# Patient Record
Sex: FEMALE | Race: WHITE | HISPANIC OR LATINO | Employment: PART TIME | URBAN - METROPOLITAN AREA
[De-identification: names, ages, dates, MRNs, and addresses within clinical notes are randomized per-mention and may not be internally consistent; named-entity substitution may affect disease eponyms.]

---

## 2020-10-27 ENCOUNTER — HOSPITAL ENCOUNTER (EMERGENCY)
Facility: HOSPITAL | Age: 45
Discharge: HOME | End: 2020-10-27
Attending: EMERGENCY MEDICINE
Payer: COMMERCIAL

## 2020-10-27 ENCOUNTER — APPOINTMENT (EMERGENCY)
Dept: CT IMAGING | Facility: HOSPITAL | Age: 45
End: 2020-10-27
Payer: COMMERCIAL

## 2020-10-27 VITALS
OXYGEN SATURATION: 98 % | BODY MASS INDEX: 35.36 KG/M2 | SYSTOLIC BLOOD PRESSURE: 110 MMHG | DIASTOLIC BLOOD PRESSURE: 61 MMHG | TEMPERATURE: 97.8 F | RESPIRATION RATE: 16 BRPM | WEIGHT: 225.75 LBS | HEART RATE: 70 BPM

## 2020-10-27 DIAGNOSIS — N64.4 BREAST PAIN, LEFT: Primary | ICD-10-CM

## 2020-10-27 DIAGNOSIS — N64.4 ACUTE BREAST PAIN: ICD-10-CM

## 2020-10-27 LAB
ANION GAP SERPL CALCULATED.3IONS-SCNC: 5 MMOL/L (ref 4–13)
BASOPHILS # BLD AUTO: 0.06 THOUSANDS/ΜL (ref 0–0.1)
BASOPHILS NFR BLD AUTO: 1 % (ref 0–1)
BUN SERPL-MCNC: 6 MG/DL (ref 5–25)
CALCIUM SERPL-MCNC: 8.7 MG/DL (ref 8.3–10.1)
CHLORIDE SERPL-SCNC: 104 MMOL/L (ref 100–108)
CO2 SERPL-SCNC: 30 MMOL/L (ref 21–32)
CREAT SERPL-MCNC: 0.69 MG/DL (ref 0.6–1.3)
EOSINOPHIL # BLD AUTO: 0.13 THOUSAND/ΜL (ref 0–0.61)
EOSINOPHIL NFR BLD AUTO: 2 % (ref 0–6)
ERYTHROCYTE [DISTWIDTH] IN BLOOD BY AUTOMATED COUNT: 13.2 % (ref 11.6–15.1)
GFR SERPL CREATININE-BSD FRML MDRD: 106 ML/MIN/1.73SQ M
GLUCOSE SERPL-MCNC: 87 MG/DL (ref 65–140)
HCT VFR BLD AUTO: 43.8 % (ref 34.8–46.1)
HGB BLD-MCNC: 14.1 G/DL (ref 11.5–15.4)
IMM GRANULOCYTES # BLD AUTO: 0.02 THOUSAND/UL (ref 0–0.2)
IMM GRANULOCYTES NFR BLD AUTO: 0 % (ref 0–2)
LYMPHOCYTES # BLD AUTO: 2.25 THOUSANDS/ΜL (ref 0.6–4.47)
LYMPHOCYTES NFR BLD AUTO: 30 % (ref 14–44)
MCH RBC QN AUTO: 29.9 PG (ref 26.8–34.3)
MCHC RBC AUTO-ENTMCNC: 32.2 G/DL (ref 31.4–37.4)
MCV RBC AUTO: 93 FL (ref 82–98)
MONOCYTES # BLD AUTO: 0.55 THOUSAND/ΜL (ref 0.17–1.22)
MONOCYTES NFR BLD AUTO: 7 % (ref 4–12)
NEUTROPHILS # BLD AUTO: 4.57 THOUSANDS/ΜL (ref 1.85–7.62)
NEUTS SEG NFR BLD AUTO: 60 % (ref 43–75)
NRBC BLD AUTO-RTO: 0 /100 WBCS
PLATELET # BLD AUTO: 218 THOUSANDS/UL (ref 149–390)
PMV BLD AUTO: 10.5 FL (ref 8.9–12.7)
POTASSIUM SERPL-SCNC: 4.3 MMOL/L (ref 3.5–5.3)
RBC # BLD AUTO: 4.72 MILLION/UL (ref 3.81–5.12)
SODIUM SERPL-SCNC: 139 MMOL/L (ref 136–145)
WBC # BLD AUTO: 7.58 THOUSAND/UL (ref 4.31–10.16)

## 2020-10-27 PROCEDURE — 85025 COMPLETE CBC W/AUTO DIFF WBC: CPT | Performed by: EMERGENCY MEDICINE

## 2020-10-27 PROCEDURE — 71260 CT THORAX DX C+: CPT

## 2020-10-27 PROCEDURE — 99284 EMERGENCY DEPT VISIT MOD MDM: CPT | Performed by: EMERGENCY MEDICINE

## 2020-10-27 PROCEDURE — 36415 COLL VENOUS BLD VENIPUNCTURE: CPT | Performed by: EMERGENCY MEDICINE

## 2020-10-27 PROCEDURE — 80048 BASIC METABOLIC PNL TOTAL CA: CPT | Performed by: EMERGENCY MEDICINE

## 2020-10-27 PROCEDURE — G1004 CDSM NDSC: HCPCS

## 2020-10-27 PROCEDURE — 99284 EMERGENCY DEPT VISIT MOD MDM: CPT

## 2020-10-27 RX ADMIN — IOHEXOL 85 ML: 350 INJECTION, SOLUTION INTRAVENOUS at 13:45

## 2020-11-11 ENCOUNTER — HOSPITAL ENCOUNTER (EMERGENCY)
Facility: HOSPITAL | Age: 45
Discharge: HOME | End: 2020-11-11
Attending: EMERGENCY MEDICINE
Payer: COMMERCIAL

## 2020-11-11 VITALS
BODY MASS INDEX: 35.56 KG/M2 | HEART RATE: 65 BPM | SYSTOLIC BLOOD PRESSURE: 109 MMHG | OXYGEN SATURATION: 100 % | RESPIRATION RATE: 16 BRPM | WEIGHT: 227.07 LBS | DIASTOLIC BLOOD PRESSURE: 58 MMHG | TEMPERATURE: 98.3 F

## 2020-11-11 DIAGNOSIS — B34.9 ACUTE VIRAL SYNDROME: Primary | ICD-10-CM

## 2020-11-11 PROCEDURE — 99282 EMERGENCY DEPT VISIT SF MDM: CPT | Performed by: PHYSICIAN ASSISTANT

## 2020-11-11 PROCEDURE — 99283 EMERGENCY DEPT VISIT LOW MDM: CPT

## 2020-11-11 PROCEDURE — 87637 SARSCOV2&INF A&B&RSV AMP PRB: CPT | Performed by: PHYSICIAN ASSISTANT

## 2020-11-14 LAB
FLUAV RNA NPH QL NAA+PROBE: NOT DETECTED
FLUBV RNA NPH QL NAA+PROBE: NOT DETECTED
RSV RNA NPH QL NAA+PROBE: NOT DETECTED
SARS-COV-2 RNA NPH QL NAA+PROBE: NOT DETECTED

## 2021-01-05 ENCOUNTER — HOSPITAL ENCOUNTER (EMERGENCY)
Facility: HOSPITAL | Age: 46
Discharge: HOME | End: 2021-01-05
Attending: EMERGENCY MEDICINE | Admitting: EMERGENCY MEDICINE
Payer: COMMERCIAL

## 2021-01-05 VITALS
TEMPERATURE: 97.7 F | HEART RATE: 70 BPM | RESPIRATION RATE: 17 BRPM | SYSTOLIC BLOOD PRESSURE: 123 MMHG | DIASTOLIC BLOOD PRESSURE: 63 MMHG | OXYGEN SATURATION: 98 %

## 2021-01-05 DIAGNOSIS — Z20.822 SUSPECTED COVID-19 VIRUS INFECTION: Primary | ICD-10-CM

## 2021-01-05 PROCEDURE — 99282 EMERGENCY DEPT VISIT SF MDM: CPT | Performed by: PHYSICIAN ASSISTANT

## 2021-01-05 PROCEDURE — 99283 EMERGENCY DEPT VISIT LOW MDM: CPT

## 2021-01-05 PROCEDURE — U0003 INFECTIOUS AGENT DETECTION BY NUCLEIC ACID (DNA OR RNA); SEVERE ACUTE RESPIRATORY SYNDROME CORONAVIRUS 2 (SARS-COV-2) (CORONAVIRUS DISEASE [COVID-19]), AMPLIFIED PROBE TECHNIQUE, MAKING USE OF HIGH THROUGHPUT TECHNOLOGIES AS DESCRIBED BY CMS-2020-01-R: HCPCS | Performed by: PHYSICIAN ASSISTANT

## 2021-01-05 NOTE — Clinical Note
Jinny Napoles was seen and treated in our emergency department on 1/5/2021  Diagnosis:     Kari    She may return on this date:     Patient tested for COVID-19  Pt will need to quarantine until results come back in approximately 3-5 days  If COVID positive, patient requires 14 day self-quarantine  If COVID negative and patient symptom free for 3 days, patient will be able to return to work  If you have any questions or concerns, please don't hesitate to call        Maine Tracy PA-C    ______________________________           _______________          _______________  Hospital Representative                              Date                                Time

## 2021-01-06 NOTE — ED PROVIDER NOTES
HPI: Patient is a 41 y o  female who presents with 2 days of chills, cough, headache, sore throat and myalgias which the patient describes at mild The patient has had contact with people with similar symptoms  The patient has not taken any medication  No Known Allergies    History reviewed  No pertinent past medical history  Past Surgical History:   Procedure Laterality Date    BREAST SURGERY       Social History     Tobacco Use    Smoking status: Current Every Day Smoker     Packs/day: 0 50    Smokeless tobacco: Never Used   Substance Use Topics    Alcohol use: Not Currently    Drug use: Never       Nursing notes reviewed  Physical Exam:  ED Triage Vitals [01/05/21 2035]   Temperature Pulse Respirations Blood Pressure SpO2   97 7 °F (36 5 °C) 70 17 123/63 98 %      Temp src Heart Rate Source Patient Position - Orthostatic VS BP Location FiO2 (%)   -- Monitor -- -- --      Pain Score       3           ROS: Positive for cough, body aches, sore throat, congestion, chills, the remainder of a 10 organ system ROS was otherwise unremarkable  General: awake, alert, no acute distress    Head: normocephalic, atraumatic    Eyes: no scleral icterus  Ears: external ears normal, hearing grossly intact  Nose: external exam grossly normal, positive nasal discharge  Neck: symmetric, No JVD noted, trachea midline  Pulmonary: no respiratory distress, no tachypnea noted  Cardiovascular: appears well perfused  Abdomen: no distention noted  Musculoskeletal: no deformities noted, tone normal  Neuro: grossly non-focal  Psych: mood and affect appropriate    The patient is stable and has a history and physical exam consistent with a viral illness  COVID19 testing has been performed  I considered the patient's other medical conditions as applicable/noted above in my medical decision making  The patient is stable upon discharge  The plan is for supportive care at home      The patient (and any family present) verbalized understanding of the discharge instructions and warnings that would necessitate return to the Emergency Department  All questions were answered prior to discharge  Quick COVID-19 Severity Index (Predicts 24-hr risk of critical respiratory illness in patient's admitted from ED with COVID-19 )   Respiratory rate: <22 = 0  Pulse oximetry >92 = 0  O2 flow rate (L/min) <2 = 0  Result = 0 (low risk - 4% risk of critical illness at 24 hours)    Medications - No data to display  Final diagnoses:   Suspected COVID-19 virus infection     Time reflects when diagnosis was documented in both MDM as applicable and the Disposition within this note     Time User Action Codes Description Comment    1/5/2021  9:51 PM Pastor Salazar Add [Z20 [id number]] Suspected COVID-19 virus infection       ED Disposition     ED Disposition Condition Date/Time Comment    Discharge Stable Tue Jan 5, 2021  9:51 PM Kari Valenzuela discharge to home/self care  Follow-up Information     Follow up With Specialties Details Why Contact Info Additional [address] [facility] Emergency Department Emergency Medicine Go to  If symptoms worsen [facility] [id number] [phone] [place] ED, [address], [place], [address]    [phone]) option 7    If you have concerns about COVID 19, call the hotline to discuss your symptoms and determine if you need testing         Patient's Medications    No medications on file     No discharge procedures on file      Electronically Signed by       Juanjose Hendrickson PA-C  01/05/21 5363

## 2021-01-07 LAB — SARS-COV-2 RNA SPEC QL NAA+PROBE: NOT DETECTED

## 2021-02-15 ENCOUNTER — OFFICE VISIT (OUTPATIENT)
Dept: FAMILY MEDICINE CLINIC | Facility: CLINIC | Age: 46
End: 2021-02-15

## 2021-02-15 ENCOUNTER — TELEPHONE (OUTPATIENT)
Dept: FAMILY MEDICINE CLINIC | Facility: CLINIC | Age: 46
End: 2021-02-15

## 2021-02-15 VITALS
RESPIRATION RATE: 16 BRPM | HEART RATE: 75 BPM | SYSTOLIC BLOOD PRESSURE: 112 MMHG | WEIGHT: 234 LBS | BODY MASS INDEX: 36.65 KG/M2 | DIASTOLIC BLOOD PRESSURE: 76 MMHG | TEMPERATURE: 98 F | OXYGEN SATURATION: 96 %

## 2021-02-15 DIAGNOSIS — E66.9 OBESITY, UNSPECIFIED CLASSIFICATION, UNSPECIFIED OBESITY TYPE, UNSPECIFIED WHETHER SERIOUS COMORBIDITY PRESENT: Primary | ICD-10-CM

## 2021-02-15 DIAGNOSIS — F17.200 SMOKES: ICD-10-CM

## 2021-02-15 DIAGNOSIS — N60.02 CYST OF LEFT BREAST: ICD-10-CM

## 2021-02-15 DIAGNOSIS — R00.2 PALPITATIONS: ICD-10-CM

## 2021-02-15 DIAGNOSIS — K21.9 GASTROESOPHAGEAL REFLUX DISEASE WITHOUT ESOPHAGITIS: ICD-10-CM

## 2021-02-15 DIAGNOSIS — M54.50 CHRONIC MIDLINE LOW BACK PAIN, UNSPECIFIED WHETHER SCIATICA PRESENT: ICD-10-CM

## 2021-02-15 DIAGNOSIS — G89.29 CHRONIC MIDLINE LOW BACK PAIN, UNSPECIFIED WHETHER SCIATICA PRESENT: ICD-10-CM

## 2021-02-15 DIAGNOSIS — Z72.0 TOBACCO ABUSE: ICD-10-CM

## 2021-02-15 PROCEDURE — 4004F PT TOBACCO SCREEN RCVD TLK: CPT | Performed by: FAMILY MEDICINE

## 2021-02-15 PROCEDURE — 3725F SCREEN DEPRESSION PERFORMED: CPT | Performed by: FAMILY MEDICINE

## 2021-02-15 PROCEDURE — 99203 OFFICE O/P NEW LOW 30 MIN: CPT | Performed by: FAMILY MEDICINE

## 2021-02-15 RX ORDER — VARENICLINE TARTRATE 0.5 MG/1
0.5 TABLET, FILM COATED ORAL 2 TIMES DAILY
Qty: 30 TABLET | Refills: 1 | Status: SHIPPED | OUTPATIENT
Start: 2021-02-15

## 2021-02-15 RX ORDER — HYDROXYZINE HYDROCHLORIDE 25 MG/1
25 TABLET, FILM COATED ORAL EVERY 6 HOURS PRN
Qty: 30 TABLET | Refills: 0 | Status: SHIPPED | OUTPATIENT
Start: 2021-02-15

## 2021-02-15 RX ORDER — OMEPRAZOLE 20 MG/1
20 CAPSULE, DELAYED RELEASE ORAL DAILY
Qty: 30 CAPSULE | Refills: 0 | Status: SHIPPED | OUTPATIENT
Start: 2021-02-15

## 2021-02-15 NOTE — TELEPHONE ENCOUNTER
Dahiana      Please schedule with Dr Guzman Lay and myself during  block on Thursday morning      Thank you
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
## 2021-02-15 NOTE — ASSESSMENT & PLAN NOTE
-Smokes 1/2 PPD since 16years old  -Tobacco Cessation Counseling: Tobacco cessation counseling and education was provided  The patient is sincerely urged to quit consumption of tobacco  She is ready to quit tobacco  The numerous health risks of tobacco consumption were discussed  Prescribed the following medications: varenicline (Chantix)

## 2021-02-15 NOTE — ASSESSMENT & PLAN NOTE
-Reviewed the causes of heartburn  Discussed importance of diet and lifestyle modifications to control GERD symptoms  Avoid things that worsen heartburn (ex: caffeine, tomato based products, spicy foods, tobacco, alcohol, obesity, tight fitting clothing ) Discussed importance of eating small, frequent meals instead of large meals   Elevate head of the bed and do not lay down for 2-3 hours following a meal    - Will start her on omeprazole 20 mg daily

## 2021-02-15 NOTE — ASSESSMENT & PLAN NOTE
-1 month onset of intermittent palpitations accompanied with SOB and parasthesia  -Suspect etiology is due to panic attack  -Given significant family history of arrhythmia will send for Holter monitor   -Will check TSH  -Atarax 25 mg PRN  -Behavioral health referral

## 2021-02-15 NOTE — ASSESSMENT & PLAN NOTE
-History of Left breast cyst versus abscess 2 years ago  -No records available on EMR  -Was seen in ED recently for Chest wall pain-> CT of chest was normal  -Will send for diagnostic mammogram and ultrasound

## 2021-02-15 NOTE — ASSESSMENT & PLAN NOTE
-Chronic low back pain   -No red flag symptoms (lower extremity weakness, & incontinence of bladder or bowel)     -Physical exam noted for midline spine tenderness and paraspinal muscle tenderness  -Will obtain X-ray of spine  -Can take Tylenol 1000 mg Q8, Ibuprofen 200 mg Q6, apply warm compresses  -Will send to physical therapy

## 2021-02-15 NOTE — ASSESSMENT & PLAN NOTE
BMI Counseling: Body mass index is 36 65 kg/m²  The BMI is above normal  Nutrition recommendations include reducing portion sizes, decreasing overall calorie intake, 3-5 servings of fruits/vegetables daily, reducing fast food intake, consuming healthier snacks, moderation in carbohydrate intake, increasing intake of lean protein and reducing intake of cholesterol  Exercise recommendations include moderate aerobic physical activity for 150 minutes/week     -Lipid panel & HBA1C

## 2021-02-17 ENCOUNTER — LAB (OUTPATIENT)
Dept: LAB | Facility: HOSPITAL | Age: 46
End: 2021-02-17
Payer: COMMERCIAL

## 2021-02-17 DIAGNOSIS — E66.9 OBESITY, UNSPECIFIED CLASSIFICATION, UNSPECIFIED OBESITY TYPE, UNSPECIFIED WHETHER SERIOUS COMORBIDITY PRESENT: ICD-10-CM

## 2021-02-17 DIAGNOSIS — R00.2 PALPITATIONS: ICD-10-CM

## 2021-02-17 LAB
CHOLEST SERPL-MCNC: 186 MG/DL (ref 50–200)
EST. AVERAGE GLUCOSE BLD GHB EST-MCNC: 97 MG/DL
HBA1C MFR BLD: 5 %
HDLC SERPL-MCNC: 61 MG/DL
LDLC SERPL CALC-MCNC: 109 MG/DL (ref 0–100)
NONHDLC SERPL-MCNC: 125 MG/DL
T4 FREE SERPL-MCNC: 0.95 NG/DL (ref 0.76–1.46)
TRIGL SERPL-MCNC: 78 MG/DL
TSH SERPL DL<=0.05 MIU/L-ACNC: 1.68 UIU/ML (ref 0.36–3.74)

## 2021-02-17 PROCEDURE — 36415 COLL VENOUS BLD VENIPUNCTURE: CPT

## 2021-02-17 PROCEDURE — 84439 ASSAY OF FREE THYROXINE: CPT

## 2021-02-17 PROCEDURE — 83036 HEMOGLOBIN GLYCOSYLATED A1C: CPT

## 2021-02-17 PROCEDURE — 84443 ASSAY THYROID STIM HORMONE: CPT

## 2021-02-17 PROCEDURE — 80061 LIPID PANEL: CPT

## 2021-04-05 ENCOUNTER — HOSPITAL ENCOUNTER (OUTPATIENT)
Dept: NON INVASIVE DIAGNOSTICS | Facility: HOSPITAL | Age: 46
Discharge: HOME | End: 2021-04-05
Payer: COMMERCIAL

## 2021-04-05 DIAGNOSIS — R00.2 PALPITATIONS: ICD-10-CM

## 2021-04-05 PROCEDURE — 93226 XTRNL ECG REC<48 HR SCAN A/R: CPT

## 2021-04-05 PROCEDURE — 93225 XTRNL ECG REC<48 HRS REC: CPT

## 2021-04-16 PROCEDURE — 93227 XTRNL ECG REC<48 HR R&I: CPT | Performed by: INTERNAL MEDICINE

## 2021-05-04 ENCOUNTER — HOSPITAL ENCOUNTER (OUTPATIENT)
Dept: RADIOLOGY | Facility: HOSPITAL | Age: 46
Discharge: HOME | End: 2021-05-04
Payer: COMMERCIAL

## 2021-05-04 DIAGNOSIS — M54.50 CHRONIC MIDLINE LOW BACK PAIN, UNSPECIFIED WHETHER SCIATICA PRESENT: ICD-10-CM

## 2021-05-04 DIAGNOSIS — G89.29 CHRONIC MIDLINE LOW BACK PAIN, UNSPECIFIED WHETHER SCIATICA PRESENT: ICD-10-CM

## 2021-05-04 PROCEDURE — 72100 X-RAY EXAM L-S SPINE 2/3 VWS: CPT

## 2021-07-22 ENCOUNTER — VBI (OUTPATIENT)
Dept: ADMINISTRATIVE | Facility: OTHER | Age: 46
End: 2021-07-22

## 2021-09-27 ENCOUNTER — HOSPITAL ENCOUNTER (EMERGENCY)
Facility: HOSPITAL | Age: 46
Discharge: HOME | End: 2021-09-27
Attending: EMERGENCY MEDICINE
Payer: COMMERCIAL

## 2021-09-27 ENCOUNTER — TELEPHONE (OUTPATIENT)
Dept: FAMILY MEDICINE CLINIC | Facility: CLINIC | Age: 46
End: 2021-09-27

## 2021-09-27 VITALS
SYSTOLIC BLOOD PRESSURE: 143 MMHG | TEMPERATURE: 98 F | HEART RATE: 67 BPM | RESPIRATION RATE: 16 BRPM | DIASTOLIC BLOOD PRESSURE: 65 MMHG | OXYGEN SATURATION: 100 % | BODY MASS INDEX: 35.43 KG/M2 | WEIGHT: 226.19 LBS

## 2021-09-27 DIAGNOSIS — Z20.822 SUSPECTED COVID-19 VIRUS INFECTION: Primary | ICD-10-CM

## 2021-09-27 LAB — SARS-COV-2 RNA RESP QL NAA+PROBE: NEGATIVE

## 2021-09-27 PROCEDURE — U0003 INFECTIOUS AGENT DETECTION BY NUCLEIC ACID (DNA OR RNA); SEVERE ACUTE RESPIRATORY SYNDROME CORONAVIRUS 2 (SARS-COV-2) (CORONAVIRUS DISEASE [COVID-19]), AMPLIFIED PROBE TECHNIQUE, MAKING USE OF HIGH THROUGHPUT TECHNOLOGIES AS DESCRIBED BY CMS-2020-01-R: HCPCS | Performed by: PHYSICIAN ASSISTANT

## 2021-09-27 PROCEDURE — 99282 EMERGENCY DEPT VISIT SF MDM: CPT

## 2021-09-27 PROCEDURE — U0005 INFEC AGEN DETEC AMPLI PROBE: HCPCS | Performed by: PHYSICIAN ASSISTANT

## 2021-09-27 PROCEDURE — 99284 EMERGENCY DEPT VISIT MOD MDM: CPT | Performed by: PHYSICIAN ASSISTANT

## 2021-09-27 RX ORDER — NAPROXEN 500 MG/1
500 TABLET ORAL 2 TIMES DAILY PRN
Qty: 30 TABLET | Refills: 0 | Status: SHIPPED | OUTPATIENT
Start: 2021-09-27 | End: 2022-07-30 | Stop reason: SDUPTHER

## 2021-09-27 RX ORDER — SENNOSIDES 8.6 MG
650 CAPSULE ORAL EVERY 8 HOURS PRN
Qty: 30 TABLET | Refills: 0 | Status: SHIPPED | OUTPATIENT
Start: 2021-09-27

## 2021-09-27 RX ORDER — BENZONATATE 100 MG/1
100 CAPSULE ORAL 3 TIMES DAILY PRN
Qty: 21 CAPSULE | Refills: 0 | Status: SHIPPED | OUTPATIENT
Start: 2021-09-27 | End: 2022-07-30

## 2021-09-27 NOTE — TELEPHONE ENCOUNTER
Pt called in says that she had a 940 virtual appt and no one called her, I told her notes states dr Aissatou Sequeira tried to call with no answer, I offered to make patient another appt for the next available which was a 340 same day and patient refused she states that this is ridiculous that she has been trying to get this taken care of since Thursday last week, I advised pt that if she didn't want the nest available for today the next option would be the emergency room pt said that fine I'll go to the emergency room since I called on Thursday and I still haven't taken care of the problem

## 2021-09-27 NOTE — TELEPHONE ENCOUNTER
Called patient on her cell phone for [phone] virtual appointment  She did not  her phone, left voicemail to call office and reschedule appointment

## 2021-10-26 ENCOUNTER — HOSPITAL ENCOUNTER (EMERGENCY)
Facility: HOSPITAL | Age: 46
Discharge: HOME | End: 2021-10-26
Attending: EMERGENCY MEDICINE
Payer: COMMERCIAL

## 2021-10-26 VITALS
BODY MASS INDEX: 35.36 KG/M2 | TEMPERATURE: 97.8 F | SYSTOLIC BLOOD PRESSURE: 124 MMHG | DIASTOLIC BLOOD PRESSURE: 77 MMHG | HEART RATE: 68 BPM | RESPIRATION RATE: 18 BRPM | WEIGHT: 225.75 LBS | OXYGEN SATURATION: 99 %

## 2021-10-26 DIAGNOSIS — R12 HEART BURN: ICD-10-CM

## 2021-10-26 DIAGNOSIS — R10.9 ACUTE ABDOMINAL PAIN: Primary | ICD-10-CM

## 2021-10-26 LAB
ALBUMIN SERPL BCP-MCNC: 3.6 G/DL (ref 3.5–5)
ALP SERPL-CCNC: 71 U/L (ref 46–116)
ALT SERPL W P-5'-P-CCNC: 26 U/L (ref 12–78)
ANION GAP SERPL CALCULATED.3IONS-SCNC: 6 MMOL/L (ref 4–13)
AST SERPL W P-5'-P-CCNC: 16 U/L (ref 5–45)
BASOPHILS # BLD AUTO: 0.05 THOUSANDS/ΜL (ref 0–0.1)
BASOPHILS NFR BLD AUTO: 1 % (ref 0–1)
BILIRUB SERPL-MCNC: 0.27 MG/DL (ref 0.2–1)
BILIRUB UR QL STRIP: NEGATIVE
BUN SERPL-MCNC: 11 MG/DL (ref 5–25)
CALCIUM SERPL-MCNC: 9.2 MG/DL (ref 8.3–10.1)
CHLORIDE SERPL-SCNC: 106 MMOL/L (ref 100–108)
CLARITY UR: CLEAR
CO2 SERPL-SCNC: 32 MMOL/L (ref 21–32)
COLOR UR: NORMAL
CREAT SERPL-MCNC: 0.84 MG/DL (ref 0.6–1.3)
EOSINOPHIL # BLD AUTO: 0.19 THOUSAND/ΜL (ref 0–0.61)
EOSINOPHIL NFR BLD AUTO: 2 % (ref 0–6)
ERYTHROCYTE [DISTWIDTH] IN BLOOD BY AUTOMATED COUNT: 13.6 % (ref 11.6–15.1)
EXT PREG TEST URINE: NEGATIVE
EXT. CONTROL ED NAV: NORMAL
GFR SERPL CREATININE-BSD FRML MDRD: 84 ML/MIN/1.73SQ M
GLUCOSE SERPL-MCNC: 64 MG/DL (ref 65–140)
GLUCOSE UR STRIP-MCNC: NEGATIVE MG/DL
HCT VFR BLD AUTO: 44.4 % (ref 34.8–46.1)
HGB BLD-MCNC: 14.3 G/DL (ref 11.5–15.4)
HGB UR QL STRIP.AUTO: NEGATIVE
IMM GRANULOCYTES # BLD AUTO: 0.02 THOUSAND/UL (ref 0–0.2)
IMM GRANULOCYTES NFR BLD AUTO: 0 % (ref 0–2)
KETONES UR STRIP-MCNC: NEGATIVE MG/DL
LEUKOCYTE ESTERASE UR QL STRIP: NEGATIVE
LIPASE SERPL-CCNC: 200 U/L (ref 73–393)
LYMPHOCYTES # BLD AUTO: 2.35 THOUSANDS/ΜL (ref 0.6–4.47)
LYMPHOCYTES NFR BLD AUTO: 28 % (ref 14–44)
MCH RBC QN AUTO: 30.4 PG (ref 26.8–34.3)
MCHC RBC AUTO-ENTMCNC: 32.2 G/DL (ref 31.4–37.4)
MCV RBC AUTO: 95 FL (ref 82–98)
MONOCYTES # BLD AUTO: 0.71 THOUSAND/ΜL (ref 0.17–1.22)
MONOCYTES NFR BLD AUTO: 8 % (ref 4–12)
NEUTROPHILS # BLD AUTO: 5.19 THOUSANDS/ΜL (ref 1.85–7.62)
NEUTS SEG NFR BLD AUTO: 61 % (ref 43–75)
NITRITE UR QL STRIP: NEGATIVE
NRBC BLD AUTO-RTO: 0 /100 WBCS
PH UR STRIP.AUTO: 5.5 [PH] (ref 4.5–8)
PLATELET # BLD AUTO: 256 THOUSANDS/UL (ref 149–390)
PMV BLD AUTO: 10.3 FL (ref 8.9–12.7)
POTASSIUM SERPL-SCNC: 4.1 MMOL/L (ref 3.5–5.3)
PROT SERPL-MCNC: 7.6 G/DL (ref 6.4–8.2)
PROT UR STRIP-MCNC: NEGATIVE MG/DL
RBC # BLD AUTO: 4.7 MILLION/UL (ref 3.81–5.12)
SODIUM SERPL-SCNC: 144 MMOL/L (ref 136–145)
SP GR UR STRIP.AUTO: >=1.03 (ref 1–1.03)
UROBILINOGEN UR QL STRIP.AUTO: 0.2 E.U./DL
WBC # BLD AUTO: 8.51 THOUSAND/UL (ref 4.31–10.16)

## 2021-10-26 PROCEDURE — 99284 EMERGENCY DEPT VISIT MOD MDM: CPT | Performed by: EMERGENCY MEDICINE

## 2021-10-26 PROCEDURE — 96374 THER/PROPH/DIAG INJ IV PUSH: CPT

## 2021-10-26 PROCEDURE — 36415 COLL VENOUS BLD VENIPUNCTURE: CPT | Performed by: EMERGENCY MEDICINE

## 2021-10-26 PROCEDURE — 81003 URINALYSIS AUTO W/O SCOPE: CPT

## 2021-10-26 PROCEDURE — 81025 URINE PREGNANCY TEST: CPT | Performed by: EMERGENCY MEDICINE

## 2021-10-26 PROCEDURE — 80053 COMPREHEN METABOLIC PANEL: CPT | Performed by: EMERGENCY MEDICINE

## 2021-10-26 PROCEDURE — 99283 EMERGENCY DEPT VISIT LOW MDM: CPT

## 2021-10-26 PROCEDURE — 85025 COMPLETE CBC W/AUTO DIFF WBC: CPT | Performed by: EMERGENCY MEDICINE

## 2021-10-26 PROCEDURE — 83690 ASSAY OF LIPASE: CPT | Performed by: EMERGENCY MEDICINE

## 2021-10-26 RX ORDER — MAGNESIUM HYDROXIDE/ALUMINUM HYDROXICE/SIMETHICONE 120; 1200; 1200 MG/30ML; MG/30ML; MG/30ML
30 SUSPENSION ORAL ONCE
Status: COMPLETED | OUTPATIENT
Start: 2021-10-26 | End: 2021-10-26

## 2021-10-26 RX ORDER — LIDOCAINE HYDROCHLORIDE 20 MG/ML
15 SOLUTION OROPHARYNGEAL ONCE
Status: COMPLETED | OUTPATIENT
Start: 2021-10-26 | End: 2021-10-26

## 2021-10-26 RX ORDER — SUCRALFATE ORAL 1 G/10ML
1 SUSPENSION ORAL 4 TIMES DAILY
Qty: 420 ML | Refills: 0 | Status: SHIPPED | OUTPATIENT
Start: 2021-10-26 | End: 2022-07-30

## 2021-10-26 RX ORDER — ONDANSETRON 2 MG/ML
4 INJECTION INTRAMUSCULAR; INTRAVENOUS ONCE
Status: COMPLETED | OUTPATIENT
Start: 2021-10-26 | End: 2021-10-26

## 2021-10-26 RX ORDER — ONDANSETRON 4 MG/1
4 TABLET, FILM COATED ORAL EVERY 8 HOURS PRN
Qty: 7 TABLET | Refills: 0 | Status: SHIPPED | OUTPATIENT
Start: 2021-10-26 | End: 2022-07-30

## 2021-10-26 RX ADMIN — ONDANSETRON 4 MG: 2 INJECTION INTRAMUSCULAR; INTRAVENOUS at 13:50

## 2021-10-26 RX ADMIN — SODIUM CHLORIDE 1000 ML: 0.9 INJECTION, SOLUTION INTRAVENOUS at 13:50

## 2021-10-26 RX ADMIN — LIDOCAINE HYDROCHLORIDE 15 ML: 20 SOLUTION ORAL; TOPICAL at 13:50

## 2021-10-26 RX ADMIN — ALUMINUM HYDROXIDE, MAGNESIUM HYDROXIDE, AND SIMETHICONE 30 ML: 200; 200; 20 SUSPENSION ORAL at 13:50

## 2021-11-09 ENCOUNTER — HOSPITAL ENCOUNTER (EMERGENCY)
Facility: HOSPITAL | Age: 46
Discharge: HOME | End: 2021-11-09
Attending: EMERGENCY MEDICINE | Admitting: EMERGENCY MEDICINE
Payer: COMMERCIAL

## 2021-11-09 VITALS
HEART RATE: 67 BPM | RESPIRATION RATE: 20 BRPM | SYSTOLIC BLOOD PRESSURE: 106 MMHG | TEMPERATURE: 98.4 F | BODY MASS INDEX: 35.53 KG/M2 | DIASTOLIC BLOOD PRESSURE: 71 MMHG | OXYGEN SATURATION: 99 % | WEIGHT: 226.85 LBS

## 2021-11-09 DIAGNOSIS — M79.10 MYALGIA: ICD-10-CM

## 2021-11-09 DIAGNOSIS — R19.7 DIARRHEA: ICD-10-CM

## 2021-11-09 DIAGNOSIS — R05.9 COUGH: ICD-10-CM

## 2021-11-09 DIAGNOSIS — Z20.822 PERSON UNDER INVESTIGATION FOR COVID-19: Primary | ICD-10-CM

## 2021-11-09 PROCEDURE — U0003 INFECTIOUS AGENT DETECTION BY NUCLEIC ACID (DNA OR RNA); SEVERE ACUTE RESPIRATORY SYNDROME CORONAVIRUS 2 (SARS-COV-2) (CORONAVIRUS DISEASE [COVID-19]), AMPLIFIED PROBE TECHNIQUE, MAKING USE OF HIGH THROUGHPUT TECHNOLOGIES AS DESCRIBED BY CMS-2020-01-R: HCPCS | Performed by: EMERGENCY MEDICINE

## 2021-11-09 PROCEDURE — 99283 EMERGENCY DEPT VISIT LOW MDM: CPT

## 2021-11-09 PROCEDURE — U0005 INFEC AGEN DETEC AMPLI PROBE: HCPCS | Performed by: EMERGENCY MEDICINE

## 2021-11-09 PROCEDURE — 99284 EMERGENCY DEPT VISIT MOD MDM: CPT | Performed by: EMERGENCY MEDICINE

## 2021-11-10 LAB — SARS-COV-2 RNA RESP QL NAA+PROBE: NEGATIVE

## 2022-02-15 ENCOUNTER — HOSPITAL ENCOUNTER (EMERGENCY)
Facility: HOSPITAL | Age: 47
Discharge: HOME | End: 2022-02-15
Attending: EMERGENCY MEDICINE | Admitting: EMERGENCY MEDICINE
Payer: MEDICARE

## 2022-02-15 VITALS
SYSTOLIC BLOOD PRESSURE: 109 MMHG | DIASTOLIC BLOOD PRESSURE: 66 MMHG | HEART RATE: 67 BPM | BODY MASS INDEX: 35.91 KG/M2 | WEIGHT: 229.28 LBS | OXYGEN SATURATION: 99 % | TEMPERATURE: 97.8 F | RESPIRATION RATE: 16 BRPM

## 2022-02-15 DIAGNOSIS — R59.0 LAD (LYMPHADENOPATHY) OF RIGHT CERVICAL REGION: Primary | ICD-10-CM

## 2022-02-15 PROCEDURE — 99284 EMERGENCY DEPT VISIT MOD MDM: CPT | Performed by: EMERGENCY MEDICINE

## 2022-02-15 PROCEDURE — 99283 EMERGENCY DEPT VISIT LOW MDM: CPT

## 2022-02-15 RX ORDER — IBUPROFEN 600 MG/1
600 TABLET ORAL EVERY 6 HOURS PRN
Qty: 30 TABLET | Refills: 0 | Status: SHIPPED | OUTPATIENT
Start: 2022-02-15 | End: 2022-07-30

## 2022-02-15 NOTE — Clinical Note
Alissaraymundotatumn Brown Merlin was seen and treated in our emergency department on 2/15/2022  Diagnosis:     Brandonivy    She may return on this date: If you have any questions or concerns, please don't hesitate to call        Keerthi Jaimes MD    ______________________________           _______________          _______________  Hospital Representative                              Date                                Time

## 2022-02-15 NOTE — ED PROVIDER NOTES
History  Chief Complaint   Patient presents with    Neck Pain     Patient reports painful lump in right side of neck  31-year-old female, current smoker, presenting for evaluation of painful right-sided neck lump  First noted approximately 1 week ago  Overnight that larger and was more painful  She had to leave work early because of the pain  There were concerned about her so sent her to the emergency department for evaluation  Has never had anything in this area before  Has not been sick recently  Has been eating and drinking normally, no pain with swallowing, no sore throat, no ear pain  No runny nose  No recent fevers or chills  Has not noticed any skin changes above it  She was concerned that might be something with her blood vessels  No coughing, shortness of breath, weight loss  Has seen a primary care doctor before but about a year ago  Has been taking ibuprofen for which does seem to help  Does not want anything currently for the pain, just wants to know what it is  Denies any painful lymph nodes in other areas such as under her armpit, no changes that she's noted in her breasts  History provided by:  Patient   used: No    Neck Pain  Associated symptoms: no chest pain and no fever        Prior to Admission Medications   Prescriptions Last Dose Informant Patient Reported?  Taking?   acetaminophen (TYLENOL) 650 mg CR tablet   No No   Sig: Take 1 tablet (650 mg total) by mouth every 8 (eight) hours as needed for mild pain or moderate pain   benzonatate (TESSALON PERLES) 100 mg capsule   No No   Sig: Take 1 capsule (100 mg total) by mouth 3 (three) times a day as needed for cough   dextromethorphan-guaifenesin (MUCINEX DM)  MG per 12 hr tablet   No No   Sig: Take 1 tablet by mouth every 12 (twelve) hours as needed for cough   hydrOXYzine HCL (ATARAX) 25 mg tablet   No No   Sig: Take 1 tablet (25 mg total) by mouth every 6 (six) hours as needed for anxiety naproxen (NAPROSYN) 500 mg tablet   No No   Sig: Take 1 tablet (500 mg total) by mouth 2 (two) times a day as needed for mild pain or moderate pain   omeprazole (PriLOSEC) 20 mg delayed release capsule   No No   Sig: Take 1 capsule (20 mg total) by mouth daily   ondansetron (ZOFRAN) 4 mg tablet   No No   Sig: Take 1 tablet (4 mg total) by mouth every 8 (eight) hours as needed for nausea or vomiting for up to 7 doses   sucralfate (CARAFATE) 1 g/10 mL suspension   No No   Sig: Take 10 mL (1 g total) by mouth 4 (four) times a day for 7 days   varenicline (CHANTIX) 0 5 mg tablet   No No   Sig: Take 1 tablet (0 5 mg total) by mouth 2 (two) times a day      Facility-Administered Medications: None       History reviewed  No pertinent past medical history  Past Surgical History:   Procedure Laterality Date    BREAST SURGERY         History reviewed  No pertinent family history  I have reviewed and agree with the history as documented  E-Cigarette/Vaping    E-Cigarette Use Never User      E-Cigarette/Vaping Substances     Social History     Tobacco Use    Smoking status: Current Every Day Smoker     Packs/day: 0 50    Smokeless tobacco: Never Used   Vaping Use    Vaping Use: Never used   Substance Use Topics    Alcohol use: Not Currently    Drug use: Never        Review of Systems   Constitutional: Negative for chills and fever  HENT: Negative for congestion and sore throat  Eyes: Negative for visual disturbance  Respiratory: Negative for cough and shortness of breath  Cardiovascular: Negative for chest pain and palpitations  Gastrointestinal: Negative for abdominal pain, diarrhea, nausea and vomiting  Genitourinary: Negative for dysuria and hematuria  Musculoskeletal: Positive for neck pain  Negative for arthralgias and back pain  Skin: Negative for color change and rash  Neurological: Negative for seizures and syncope  Psychiatric/Behavioral: Negative for confusion   The patient is not nervous/anxious  All other systems reviewed and are negative  Physical Exam  ED Triage Vitals   Temperature Pulse Respirations Blood Pressure SpO2   02/15/22 0248 02/15/22 0247 02/15/22 0247 02/15/22 0247 02/15/22 0248   97 8 °F (36 6 °C) 67 16 109/66 99 %      Temp Source Heart Rate Source Patient Position - Orthostatic VS BP Location FiO2 (%)   02/15/22 0248 -- -- -- --   Oral          Pain Score       02/15/22 0245       7             Orthostatic Vital Signs  Vitals:    02/15/22 0247   BP: 109/66   Pulse: 67       Physical Exam  Vitals and nursing note reviewed  Constitutional:       General: She is not in acute distress  Appearance: Normal appearance  She is well-developed  She is not ill-appearing or diaphoretic  HENT:      Head: Normocephalic and atraumatic  Right Ear: External ear normal       Left Ear: External ear normal       Nose: Nose normal       Mouth/Throat:      Mouth: Mucous membranes are moist       Pharynx: Oropharynx is clear  Eyes:      Conjunctiva/sclera: Conjunctivae normal    Neck:      Comments: Right anterior cervical lymphadenopathy, one larger about 1 cm in diameter, one distal to that that is about 4mm  Both are firm but mobile with tenderness  No overlying skin changes  Bedside US shows likely lymph node, not overlying vasculature  EJ, IJ, carotid are WNL on exam without evidence of clot  Cardiovascular:      Rate and Rhythm: Normal rate and regular rhythm  Heart sounds: No murmur heard  Pulmonary:      Effort: Pulmonary effort is normal  No respiratory distress  Breath sounds: Normal breath sounds  No wheezing  Abdominal:      General: There is no distension  Palpations: Abdomen is soft  Tenderness: There is no abdominal tenderness  Musculoskeletal:         General: Normal range of motion  Cervical back: Normal range of motion and neck supple  Right lower leg: No edema  Left lower leg: No edema     Skin:     General: Skin is warm and dry  Neurological:      General: No focal deficit present  Mental Status: She is alert  Gait: Gait normal    Psychiatric:         Mood and Affect: Mood normal          ED Medications  Medications - No data to display    Diagnostic Studies  Results Reviewed     None                 No orders to display         Procedures  Procedures      ED Course                                       MDM  Number of Diagnoses or Management Options  LAD (lymphadenopathy) of right cervical region  Diagnosis management comments: 30-year-old female presenting for evaluation of painful lump in her right neck  Consistent with lymphadenopathy  No evidence of lymphangitis, no overlying skin changes  Recommended supportive care and anti-inflammatories  Also discussed with patient the concern for malignancy if this does not improve as well as given her cancer history  Will follow up with her PCP in 1 week for repeat evaluation and possible further evaluation  Discharge  Disposition  Final diagnoses:   LAD (lymphadenopathy) of right cervical region     Time reflects when diagnosis was documented in both MDM as applicable and the Disposition within this note     Time User Action Codes Description Comment    2/15/2022  4:24 AM Rivard, Duane Rummer Add [R59 0] LAD (lymphadenopathy) of right cervical region       ED Disposition     ED Disposition Condition Date/Time Comment    Discharge Stable Tue Feb 15, 2022  4:24 AM [address] Baxter discharge to home/self care              Follow-up Information     Follow up With Specialties Details Why Contact Info Additional Information    [address] [facility] Emergency Medicine Go to  As needed [facility] [id number] [facility] Emergency Department, [address] [place], [place], [address]          Discharge Medication List as of 2/15/2022  4:25 AM      START taking these medications    Details   ibuprofen (MOTRIN) 600 mg tablet Take 1 tablet (600 mg total) by mouth every 6 (six) hours as needed for mild pain or moderate pain, Starting Tue 2/15/2022, Normal         CONTINUE these medications which have NOT CHANGED    Details   acetaminophen (TYLENOL) 650 mg CR tablet Take 1 tablet (650 mg total) by mouth every 8 (eight) hours as needed for mild pain or moderate pain, Starting Mon 9/27/2021, Normal      benzonatate (TESSALON PERLES) 100 mg capsule Take 1 capsule (100 mg total) by mouth 3 (three) times a day as needed for cough, Starting Mon 9/27/2021, Normal      dextromethorphan-guaifenesin (MUCINEX DM)  MG per 12 hr tablet Take 1 tablet by mouth every 12 (twelve) hours as needed for cough, Starting Mon 9/27/2021, Normal      hydrOXYzine HCL (ATARAX) 25 mg tablet Take 1 tablet (25 mg total) by mouth every 6 (six) hours as needed for anxiety, Starting Mon 2/15/2021, Normal      naproxen (NAPROSYN) 500 mg tablet Take 1 tablet (500 mg total) by mouth 2 (two) times a day as needed for mild pain or moderate pain, Starting Mon 9/27/2021, Normal      omeprazole (PriLOSEC) 20 mg delayed release capsule Take 1 capsule (20 mg total) by mouth daily, Starting Mon 2/15/2021, Normal      ondansetron (ZOFRAN) 4 mg tablet Take 1 tablet (4 mg total) by mouth every 8 (eight) hours as needed for nausea or vomiting for up to 7 doses, Starting Tue 10/26/2021, Normal      sucralfate (CARAFATE) 1 g/10 mL suspension Take 10 mL (1 g total) by mouth 4 (four) times a day for 7 days, Starting Tue 10/26/2021, Until Tue 11/2/2021, Normal      varenicline (CHANTIX) 0 5 mg tablet Take 1 tablet (0 5 mg total) by mouth 2 (two) times a day, Starting Mon 2/15/2021, Normal           No discharge procedures on file  PDMP Review     None           ED Provider  Attending physically available and evaluated Tarahlyn Brown Merlin I managed the patient along with the ED Attending      Electronically Signed by         Amara Martinez MD  02/15/22 0620

## 2022-02-15 NOTE — DISCHARGE INSTRUCTIONS
Please schedule an appointment to see your PCP in a week as you may require another ultrasound and/or lab work if your symptoms persist of if your doctor feels they are necessary

## 2022-06-13 ENCOUNTER — TELEPHONE (OUTPATIENT)
Dept: FAMILY MEDICINE CLINIC | Facility: CLINIC | Age: 47
End: 2022-06-13

## 2022-07-30 ENCOUNTER — OFFICE VISIT (OUTPATIENT)
Dept: FAMILY MEDICINE CLINIC | Facility: CLINIC | Age: 47
End: 2022-07-30

## 2022-07-30 VITALS
BODY MASS INDEX: 38.37 KG/M2 | SYSTOLIC BLOOD PRESSURE: 120 MMHG | TEMPERATURE: 98.7 F | OXYGEN SATURATION: 97 % | DIASTOLIC BLOOD PRESSURE: 70 MMHG | HEART RATE: 83 BPM | WEIGHT: 245 LBS | RESPIRATION RATE: 16 BRPM

## 2022-07-30 DIAGNOSIS — K04.7 DENTAL INFECTION: Primary | ICD-10-CM

## 2022-07-30 PROCEDURE — 99214 OFFICE O/P EST MOD 30 MIN: CPT | Performed by: NURSE PRACTITIONER

## 2022-07-30 RX ORDER — AMOXICILLIN 500 MG/1
500 CAPSULE ORAL EVERY 8 HOURS SCHEDULED
Qty: 30 CAPSULE | Refills: 0 | Status: SHIPPED | OUTPATIENT
Start: 2022-07-30 | End: 2022-08-09

## 2022-07-30 RX ORDER — NAPROXEN 500 MG/1
500 TABLET ORAL 2 TIMES DAILY PRN
Qty: 30 TABLET | Refills: 0 | Status: SHIPPED | OUTPATIENT
Start: 2022-07-30

## 2022-07-30 NOTE — PROGRESS NOTES
Assessment/Plan:    Kari was seen today for dental pain  Diagnoses and all orders for this visit:    Dental infection  -     amoxicillin (AMOXIL) 500 mg capsule; Take 1 capsule (500 mg total) by mouth every 8 (eight) hours for 10 days  -     naproxen (NAPROSYN) 500 mg tablet; Take 1 tablet (500 mg total) by mouth 2 (two) times a day as needed for mild pain or moderate pain    advised patient to call insurance company to see where she can go for dental care so that she is able to be seen sooner  Continue with salt water gargles TID  ED precautions discussed  Follow up with me for AWV, chronic conditions         Subjective:     Cherrylyn Roswell Schirmer is a 56 y o  female who  has no past medical history on file  who presented to the office today for same day visit  She is having upper dental pain that radiates to the right cheek and gingival inflammation x 3 days  She has not been to a dentist in many years  She tried to make an appointment upstairs but is not able to be seen until 1/2023  She has been using salt water gargles several times a day       The following portions of the patient's history were reviewed and updated as appropriate: allergies, current medications, past family history, past medical history, past social history, past surgical history and problem list     Current Outpatient Medications on File Prior to Visit   Medication Sig Dispense Refill    acetaminophen (TYLENOL) 650 mg CR tablet Take 1 tablet (650 mg total) by mouth every 8 (eight) hours as needed for mild pain or moderate pain 30 tablet 0    benzonatate (TESSALON PERLES) 100 mg capsule Take 1 capsule (100 mg total) by mouth 3 (three) times a day as needed for cough 21 capsule 0    dextromethorphan-guaifenesin (MUCINEX DM)  MG per 12 hr tablet Take 1 tablet by mouth every 12 (twelve) hours as needed for cough 14 tablet 0    hydrOXYzine HCL (ATARAX) 25 mg tablet Take 1 tablet (25 mg total) by mouth every 6 (six) hours as needed for anxiety 30 tablet 0    omeprazole (PriLOSEC) 20 mg delayed release capsule Take 1 capsule (20 mg total) by mouth daily 30 capsule 0    ondansetron (ZOFRAN) 4 mg tablet Take 1 tablet (4 mg total) by mouth every 8 (eight) hours as needed for nausea or vomiting for up to 7 doses 7 tablet 0    sucralfate (CARAFATE) 1 g/10 mL suspension Take 10 mL (1 g total) by mouth 4 (four) times a day for 7 days 420 mL 0    varenicline (CHANTIX) 0 5 mg tablet Take 1 tablet (0 5 mg total) by mouth 2 (two) times a day 30 tablet 1    [DISCONTINUED] ibuprofen (MOTRIN) 600 mg tablet Take 1 tablet (600 mg total) by mouth every 6 (six) hours as needed for mild pain or moderate pain 30 tablet 0    [DISCONTINUED] naproxen (NAPROSYN) 500 mg tablet Take 1 tablet (500 mg total) by mouth 2 (two) times a day as needed for mild pain or moderate pain 30 tablet 0     No current facility-administered medications on file prior to visit  Review of Systems   Constitutional: Negative for chills and fever  HENT: Positive for dental problem  Negative for ear pain, sinus pressure, sinus pain, sore throat and trouble swallowing  Eyes: Negative for pain and visual disturbance  Respiratory: Negative for cough and shortness of breath  Cardiovascular: Negative for chest pain and palpitations  Gastrointestinal: Negative for abdominal pain and vomiting  Genitourinary: Negative for dysuria and hematuria  Musculoskeletal: Negative for arthralgias and back pain  Skin: Negative for color change and rash  Neurological: Negative for seizures and syncope  All other systems reviewed and are negative  Objective:    /70 (BP Location: Right arm, Patient Position: Sitting, Cuff Size: Large)   Pulse 83   Temp 98 7 °F (37 1 °C) (Temporal)   Resp 16   Wt 111 kg (245 lb)   LMP 07/12/2022 (Approximate)   SpO2 97%   Breastfeeding No   BMI 38 37 kg/m²     Physical Exam  Vitals and nursing note reviewed  Constitutional:       General: She is not in acute distress  Appearance: She is well-developed  She is not diaphoretic  HENT:      Head: Normocephalic and atraumatic  Mouth/Throat:      Dentition: Abnormal dentition  Dental tenderness, gingival swelling and dental caries present  Cardiovascular:      Rate and Rhythm: Normal rate and regular rhythm  Pulmonary:      Effort: Pulmonary effort is normal  No respiratory distress  Breath sounds: Normal breath sounds  No wheezing  Abdominal:      Palpations: Abdomen is soft  Musculoskeletal:         General: No deformity  Normal range of motion  Cervical back: Normal range of motion and neck supple  Lymphadenopathy:      Cervical: No cervical adenopathy  Skin:     General: Skin is warm and dry  Capillary Refill: Capillary refill takes less than 2 seconds  Neurological:      Mental Status: She is alert and oriented to person, place, and time     Psychiatric:         Behavior: Behavior normal          GAMALIEL Sood  07/30/22  9:22 AM

## 2022-08-26 ENCOUNTER — OFFICE VISIT (OUTPATIENT)
Dept: FAMILY MEDICINE CLINIC | Facility: CLINIC | Age: 47
End: 2022-08-26

## 2022-08-26 VITALS
DIASTOLIC BLOOD PRESSURE: 76 MMHG | RESPIRATION RATE: 18 BRPM | WEIGHT: 253 LBS | OXYGEN SATURATION: 96 % | TEMPERATURE: 97.3 F | BODY MASS INDEX: 39.63 KG/M2 | SYSTOLIC BLOOD PRESSURE: 112 MMHG | HEART RATE: 76 BPM

## 2022-08-26 DIAGNOSIS — Z00.00 ANNUAL PHYSICAL EXAM: Primary | ICD-10-CM

## 2022-08-26 DIAGNOSIS — N92.1 MENORRHAGIA WITH IRREGULAR CYCLE: ICD-10-CM

## 2022-08-26 DIAGNOSIS — Z12.39 BREAST CANCER SCREENING, HIGH RISK PATIENT: ICD-10-CM

## 2022-08-26 DIAGNOSIS — Z91.89 NEED FOR DENTAL CARE: ICD-10-CM

## 2022-08-26 DIAGNOSIS — Z72.0 TOBACCO ABUSE: ICD-10-CM

## 2022-08-26 DIAGNOSIS — Z12.11 COLON CANCER SCREENING: ICD-10-CM

## 2022-08-26 DIAGNOSIS — N39.46 MIXED INCONTINENCE URGE AND STRESS: ICD-10-CM

## 2022-08-26 DIAGNOSIS — Z12.4 CERVICAL CANCER SCREENING: ICD-10-CM

## 2022-08-26 DIAGNOSIS — F33.9 DEPRESSION, RECURRENT (HCC): ICD-10-CM

## 2022-08-26 LAB
SL AMB  POCT GLUCOSE, UA: NEGATIVE
SL AMB LEUKOCYTE ESTERASE,UA: NEGATIVE
SL AMB POCT BILIRUBIN,UA: NEGATIVE
SL AMB POCT BLOOD,UA: NEGATIVE
SL AMB POCT CLARITY,UA: CLEAR
SL AMB POCT COLOR,UA: YELLOW
SL AMB POCT KETONES,UA: NEGATIVE
SL AMB POCT NITRITE,UA: NEGATIVE
SL AMB POCT PH,UA: 6
SL AMB POCT SPECIFIC GRAVITY,UA: 1.01
SL AMB POCT URINE PROTEIN: NEGATIVE
SL AMB POCT UROBILINOGEN: NEGATIVE

## 2022-08-26 PROCEDURE — 81002 URINALYSIS NONAUTO W/O SCOPE: CPT | Performed by: NURSE PRACTITIONER

## 2022-08-26 PROCEDURE — 99214 OFFICE O/P EST MOD 30 MIN: CPT | Performed by: NURSE PRACTITIONER

## 2022-08-26 RX ORDER — BUPROPION HYDROCHLORIDE 75 MG/1
75 TABLET ORAL 2 TIMES DAILY
Qty: 60 TABLET | Refills: 0 | Status: SHIPPED | OUTPATIENT
Start: 2022-08-26

## 2022-08-26 RX ORDER — NICOTINE 21 MG/24HR
1 PATCH, TRANSDERMAL 24 HOURS TRANSDERMAL EVERY 24 HOURS
Qty: 28 PATCH | Refills: 0 | Status: SHIPPED | OUTPATIENT
Start: 2022-08-26

## 2022-08-26 NOTE — PROGRESS NOTES
ADULT ANNUAL PHYSICAL  [facility]    NAME: Kari Fonseca  AGE: 56 y o  SEX: female  : 1975     DATE: 2022     Assessment and Plan:     Problem List Items Addressed This Visit        Other    Tobacco abuse     She is interested in trying bupropion and patches to aid in cessation , sent to the pharmacy          Relevant Medications    nicotine (NICODERM CQ) 21 mg/24 hr TD 24 hr patch    buPROPion (WELLBUTRIN) 75 mg tablet    Depression, recurrent (HCC)    Relevant Medications    nicotine (NICODERM CQ) 21 mg/24 hr TD 24 hr patch    buPROPion (WELLBUTRIN) 75 mg tablet    Mixed incontinence urge and stress     Urine dip in office negative for any abnormality   Counseled on timed voiding and avoiding bladder irritants   Referral to urology          Relevant Orders    Ambulatory Referral to Urology    POCT urine dip (Completed)      Other Visit Diagnoses     Annual physical exam    -  Primary    Need for dental care        Relevant Orders    Ambulatory Referral to Dentistry    Cervical cancer screening        Relevant Orders    Ambulatory Referral to Obstetrics / Gynecology    Breast cancer screening, high risk patient        Relevant Orders    Mammo screening bilateral w cad    Colon cancer screening        Relevant Orders    Cologuard    Menorrhagia with irregular cycle        Relevant Orders    US pelvis complete w transvaginal          Immunizations and preventive care screenings were discussed with patient today  Appropriate education was printed on patient's after visit summary  Counseling:  Alcohol/drug use: discussed moderation in alcohol intake, the recommendations for healthy alcohol use, and avoidance of illicit drug use  Dental Health: discussed importance of regular tooth brushing, flossing, and dental visits    Injury prevention: discussed safety/seat belts, safety helmets, smoke detectors, carbon dioxide detectors, and smoking near bedding or upholstery  Sexual health: discussed sexually transmitted diseases, partner selection, use of condoms, avoidance of unintended pregnancy, and contraceptive alternatives  · Exercise: the importance of regular exercise/physical activity was discussed  Recommend exercise 3-5 times per week for at least 30 minutes  Return in about 4 weeks (around 2022) for urine frequency   Chief Complaint:     Chief Complaint   Patient presents with    Physical Exam     57 y/o      History of Present Illness:     Adult Annual Physical   Patient here for a comprehensive physical exam  The patient reports that she has been getting her menses 2 x/ monthly for past several months and flow is very heavy  Also having issues with urinary incontinence  When she sneezes or coughs she is incontinent  Also notes that when she has to urinate, sensation comes on suddenly and sometime she does not make it to the bathroom  Diet and Physical Activity  · Diet/Nutrition: well balanced diet  · Exercise: no formal exercise  Depression Screening  PHQ-2/9 Depression Screening    Little interest or pleasure in doing things: 3 - nearly every day  Feeling down, depressed, or hopeless: 3 - nearly every day  Trouble falling or staying asleep, or sleeping too much: 2 - more than half the days  Feeling tired or having little energy: 0 - not at all  Poor appetite or overeatin - not at all  Feeling bad about yourself - or that you are a failure or have let yourself or your family down: 3 - nearly every day  Trouble concentrating on things, such as reading the newspaper or watching television: 3 - nearly every day  Moving or speaking so slowly that other people could have noticed   Or the opposite - being so fidgety or restless that you have been moving around a lot more than usual: 3 - nearly every day  Thoughts that you would be better off dead, or of hurting yourself in some way: 0 - not at all  PHQ-2 Score: 6  PHQ-2 Interpretation: POSITIVE depression screen  PHQ-9 Score: 17   PHQ-9 Interpretation: Moderately severe depression        General Health  · Sleep: sleeps well  · Hearing: normal - bilateral   · Vision: no vision problems  · Dental: no dental visits for >1 year  /GYN Health  · Patient is: perimenopausal  · Last menstrual period: 7/25/2022     Review of Systems:     Review of Systems   Constitutional: Negative for activity change, appetite change, chills, fatigue, fever and unexpected weight change  HENT: Positive for dental problem  Negative for hearing loss, nosebleeds, sinus pain, sneezing, sore throat and trouble swallowing  Eyes: Negative for photophobia and visual disturbance  Respiratory: Negative for cough, chest tightness, shortness of breath and wheezing  Cardiovascular: Negative for chest pain, palpitations and leg swelling  Gastrointestinal: Negative for abdominal pain, constipation, nausea and vomiting  Genitourinary: Positive for difficulty urinating  Negative for decreased urine volume, dysuria, flank pain, genital sores, hematuria and urgency  Musculoskeletal: Negative for back pain and gait problem  Skin: Negative for pallor, rash and wound  Neurological: Negative for dizziness, seizures, syncope, weakness, numbness and headaches  Hematological: Negative for adenopathy  Does not bruise/bleed easily  Psychiatric/Behavioral: Negative for confusion, hallucinations, self-injury, sleep disturbance and suicidal ideas  The patient is not nervous/anxious  Past Medical History:     History reviewed  No pertinent past medical history     Past Surgical History:     Past Surgical History:   Procedure Laterality Date    BREAST SURGERY        Social History:     Social History     Socioeconomic History    Marital status: Single     Spouse name: None    Number of children: None    Years of education: None    Highest education level: None Occupational History    None   Tobacco Use    Smoking status: Current Every Day Smoker     Packs/day: 0 50    Smokeless tobacco: Never Used   Vaping Use    Vaping Use: Never used   Substance and Sexual Activity    Alcohol use: Not Currently    Drug use: Never    Sexual activity: None   Other Topics Concern    None   Social History Narrative    None     Social Determinants of Health     Financial Resource Strain: Not on file   Food Insecurity: Not on file   Transportation Needs: Not on file   Physical Activity: Not on file   Stress: Not on file   Social Connections: Not on file   Intimate Partner Violence: Not on file   Housing Stability: Not on file      Family History:     History reviewed  No pertinent family history  Current Medications:     Current Outpatient Medications   Medication Sig Dispense Refill    buPROPion (WELLBUTRIN) 75 mg tablet Take 1 tablet (75 mg total) by mouth 2 (two) times a day 60 tablet 0    nicotine (NICODERM CQ) 21 mg/24 hr TD 24 hr patch Place 1 patch on the skin every 24 hours 28 patch 0    acetaminophen (TYLENOL) 650 mg CR tablet Take 1 tablet (650 mg total) by mouth every 8 (eight) hours as needed for mild pain or moderate pain 30 tablet 0    hydrOXYzine HCL (ATARAX) 25 mg tablet Take 1 tablet (25 mg total) by mouth every 6 (six) hours as needed for anxiety 30 tablet 0    naproxen (NAPROSYN) 500 mg tablet Take 1 tablet (500 mg total) by mouth 2 (two) times a day as needed for mild pain or moderate pain 30 tablet 0     No current facility-administered medications for this visit  Allergies:     No Known Allergies   Physical Exam:     /76 (BP Location: Left arm, Patient Position: Sitting, Cuff Size: Large)   Pulse 76   Temp (!) 97 3 °F (36 3 °C) (Temporal)   Resp 18   Wt 115 kg (253 lb)   LMP 07/25/2022   SpO2 96%   BMI 39 63 kg/m²     Physical Exam  Vitals and nursing note reviewed  Constitutional:       General: She is not in acute distress  Appearance: Normal appearance  She is not diaphoretic  HENT:      Head: Normocephalic  Right Ear: Tympanic membrane and external ear normal       Left Ear: Tympanic membrane and external ear normal       Nose: Nose normal       Mouth/Throat:      Mouth: Mucous membranes are moist    Eyes:      General:         Right eye: No discharge  Left eye: No discharge  Extraocular Movements: Extraocular movements intact  Conjunctiva/sclera: Conjunctivae normal       Pupils: Pupils are equal, round, and reactive to light  Cardiovascular:      Rate and Rhythm: Normal rate and regular rhythm  Pulses: Normal pulses  Heart sounds: Normal heart sounds  Pulmonary:      Effort: Pulmonary effort is normal  No respiratory distress  Breath sounds: Normal breath sounds  Abdominal:      General: Bowel sounds are normal  There is no distension  Palpations: Abdomen is soft  Musculoskeletal:         General: Normal range of motion  Cervical back: Normal range of motion and neck supple  No rigidity  Right lower leg: No edema  Left lower leg: No edema  Lymphadenopathy:      Cervical: No cervical adenopathy  Skin:     General: Skin is warm and dry  Capillary Refill: Capillary refill takes less than 2 seconds  Findings: No rash  Neurological:      General: No focal deficit present  Mental Status: She is alert and oriented to person, place, and time     Psychiatric:         Mood and Affect: Mood normal          Behavior: Behavior normal               Joan Chisholm, [address] [place] [address]

## 2022-08-26 NOTE — PATIENT INSTRUCTIONS

## 2022-08-26 NOTE — ASSESSMENT & PLAN NOTE
Urine dip in office negative for any abnormality   Counseled on timed voiding and avoiding bladder irritants   Referral to urology

## 2022-11-30 ENCOUNTER — OFFICE VISIT (OUTPATIENT)
Dept: FAMILY MEDICINE CLINIC | Facility: CLINIC | Age: 47
End: 2022-11-30

## 2022-11-30 VITALS
BODY MASS INDEX: 37.65 KG/M2 | HEART RATE: 112 BPM | SYSTOLIC BLOOD PRESSURE: 126 MMHG | WEIGHT: 254.2 LBS | RESPIRATION RATE: 17 BRPM | OXYGEN SATURATION: 97 % | HEIGHT: 69 IN | TEMPERATURE: 98 F | DIASTOLIC BLOOD PRESSURE: 84 MMHG

## 2022-11-30 DIAGNOSIS — B34.9 ACUTE VIRAL SYNDROME: Primary | ICD-10-CM

## 2022-11-30 PROBLEM — R00.2 PALPITATIONS: Status: RESOLVED | Noted: 2021-02-15 | Resolved: 2022-11-30

## 2022-11-30 RX ORDER — OSELTAMIVIR PHOSPHATE 75 MG/1
75 CAPSULE ORAL 2 TIMES DAILY
Qty: 10 CAPSULE | Refills: 0 | Status: SHIPPED | OUTPATIENT
Start: 2022-11-30 | End: 2022-12-05

## 2022-11-30 NOTE — ASSESSMENT & PLAN NOTE
Likely viral illness, patient agreeable to COVID and flu testing  Agreeable to start Tamiflu while waiting for results  Discussed possible side effects  OTC medications as needed for symptom relief  Increase hydration  Adequate rest  ER precaution given  Return to care if symptoms worsen or fail to improve

## 2022-11-30 NOTE — PROGRESS NOTES
Name: Marichuy Villa      : 1975      MRN: [id number]  Encounter Provider: [address]  Encounter Date: 2022   Encounter department: [address] [place] [address]  Acute viral syndrome  Assessment & Plan:  Likely viral illness, patient agreeable to COVID and flu testing  Agreeable to start Tamiflu while waiting for results  Discussed possible side effects  OTC medications as needed for symptom relief  Increase hydration  Adequate rest  ER precaution given  Return to care if symptoms worsen or fail to improve  Orders:  -     Covid/Flu- Office Collect  -     oseltamivir (TAMIFLU) 75 mg capsule; Take 1 capsule (75 mg total) by mouth 2 (two) times a day for 5 days         Subjective      Patient is a 23-year-old female presenting for a same-day appointment  She is complaining of fatigue, body aches, and headache for 1 day  Admits to fever and chills  States her fever last night was 80 F  she has been taking Tylenol and ibuprofen with some relief  Admits cough and congestion  Denies feeling dizzy or lightheaded  Denies wheezing or shortness of breath  Denies nausea or vomiting  Her son is sick with similar symptoms  He was also tested for flu and COVID but results are pending  No other ill contacts that she is aware of  Review of Systems   Constitutional: Positive for chills, fatigue and fever  Negative for appetite change and unexpected weight change  HENT: Positive for congestion, postnasal drip, rhinorrhea and sore throat  Negative for ear discharge, ear pain, sinus pressure, sinus pain, tinnitus and trouble swallowing  Respiratory: Positive for cough  Negative for chest tightness, shortness of breath and wheezing  Cardiovascular: Negative for chest pain, palpitations and leg swelling  Gastrointestinal: Negative for abdominal pain, diarrhea, nausea and vomiting     Musculoskeletal: Positive for arthralgias and myalgias  Skin: Negative for rash  Neurological: Positive for headaches  Negative for dizziness, tremors, weakness, light-headedness and numbness  Hematological: Negative for adenopathy  Current Outpatient Medications on File Prior to Visit   Medication Sig   • acetaminophen (TYLENOL) 650 mg CR tablet Take 1 tablet (650 mg total) by mouth every 8 (eight) hours as needed for mild pain or moderate pain   • buPROPion (WELLBUTRIN) 75 mg tablet Take 1 tablet (75 mg total) by mouth 2 (two) times a day   • hydrOXYzine HCL (ATARAX) 25 mg tablet Take 1 tablet (25 mg total) by mouth every 6 (six) hours as needed for anxiety   • naproxen (NAPROSYN) 500 mg tablet Take 1 tablet (500 mg total) by mouth 2 (two) times a day as needed for mild pain or moderate pain   • nicotine (NICODERM CQ) 21 mg/24 hr TD 24 hr patch Place 1 patch on the skin every 24 hours       Objective     /84 (BP Location: Right arm, Patient Position: Sitting, Cuff Size: Large)   Pulse (!) 112   Temp 98 °F (36 7 °C) (Temporal)   Resp 17   Ht 5' 9" (1 753 m)   Wt 115 kg (254 lb 3 2 oz)   SpO2 97%   BMI 37 54 kg/m²     Physical Exam  Vitals and nursing note reviewed  Constitutional:       General: She is awake  She is not in acute distress  Appearance: Normal appearance  She is well-developed and well-groomed  She is obese  She is not ill-appearing  HENT:      Head: Normocephalic and atraumatic  Right Ear: Hearing, tympanic membrane, ear canal and external ear normal       Left Ear: Hearing, tympanic membrane, ear canal and external ear normal       Nose: Congestion and rhinorrhea present  Rhinorrhea is clear  Mouth/Throat:      Mouth: Mucous membranes are moist       Pharynx: Oropharynx is clear  Uvula midline  No oropharyngeal exudate or posterior oropharyngeal erythema  Tonsils: No tonsillar exudate or tonsillar abscesses  Eyes:      General: No scleral icterus       Conjunctiva/sclera: Conjunctivae normal    Cardiovascular:      Rate and Rhythm: Normal rate and regular rhythm  Pulses: Normal pulses  Heart sounds: Normal heart sounds  No murmur heard  Pulmonary:      Effort: Pulmonary effort is normal  No respiratory distress  Breath sounds: Normal breath sounds and air entry  No decreased air movement  No decreased breath sounds, wheezing, rhonchi or rales  Abdominal:      General: Abdomen is flat  Bowel sounds are normal  There is no distension  Palpations: Abdomen is soft  There is no mass  Tenderness: There is no abdominal tenderness  There is no right CVA tenderness, left CVA tenderness, guarding or rebound  Hernia: No hernia is present  Musculoskeletal:         General: Normal range of motion  Cervical back: Neck supple  Right lower leg: No edema  Left lower leg: No edema  Lymphadenopathy:      Cervical: No cervical adenopathy  Skin:     General: Skin is warm  Capillary Refill: Capillary refill takes less than 2 seconds  Coloration: Skin is not jaundiced  Findings: No rash  Neurological:      General: No focal deficit present  Mental Status: She is alert and oriented to person, place, and time  Mental status is at baseline  Motor: Motor function is intact  Coordination: Coordination is intact  Gait: Gait is intact  Psychiatric:         Attention and Perception: Attention normal          Mood and Affect: Mood and affect normal          Speech: Speech normal          Behavior: Behavior normal  Behavior is cooperative  Thought Content:  Thought content normal          Cognition and Memory: Cognition normal        [address] [place] [address]

## 2022-11-30 NOTE — LETTER
November 30, 2022     Patient: Anthony Stearns  YOB: 1975  Date of Visit: 11/30/2022      To Whom it May Concern:    Kari Haney is under my professional care  Kari was seen in my office on 11/30/2022  Kari may return to work on 12/3  If you have any questions or concerns, please don't hesitate to call           Sincerely,          [employer] [facility]        CC: No Recipients

## 2022-12-01 LAB
FLUAV RNA RESP QL NAA+PROBE: POSITIVE
FLUBV RNA RESP QL NAA+PROBE: NEGATIVE
SARS-COV-2 RNA RESP QL NAA+PROBE: NEGATIVE

## 2023-04-25 ENCOUNTER — TELEPHONE (OUTPATIENT)
Dept: PSYCHIATRY | Facility: CLINIC | Age: 48
End: 2023-04-25

## 2023-04-25 NOTE — TELEPHONE ENCOUNTER
Patient has been added to the non-referral wait list     Confirmed Insurance: Yes [x]  [facility]  Provider Preference: pref fem prov  Virtual: Yes [] No [x]

## 2023-05-02 ENCOUNTER — TELEPHONE (OUTPATIENT)
Dept: ADMINISTRATIVE | Facility: OTHER | Age: 48
End: 2023-05-02

## 2023-05-02 NOTE — TELEPHONE ENCOUNTER
05/02/23 12:54 PM    The patient was called and a message was left to call the ordering provider's office regarding an open order  Thank you    Jeana Began  [facility]

## 2023-07-03 ENCOUNTER — HOSPITAL ENCOUNTER (EMERGENCY)
Facility: HOSPITAL | Age: 48
Discharge: HOME | End: 2023-07-03
Attending: EMERGENCY MEDICINE
Payer: MEDICARE

## 2023-07-03 VITALS
DIASTOLIC BLOOD PRESSURE: 82 MMHG | OXYGEN SATURATION: 98 % | BODY MASS INDEX: 36.84 KG/M2 | WEIGHT: 249.5 LBS | HEART RATE: 64 BPM | SYSTOLIC BLOOD PRESSURE: 138 MMHG | RESPIRATION RATE: 16 BRPM | TEMPERATURE: 98.4 F

## 2023-07-03 DIAGNOSIS — K08.89 PAIN, DENTAL: Primary | ICD-10-CM

## 2023-07-03 PROCEDURE — 99283 EMERGENCY DEPT VISIT LOW MDM: CPT

## 2023-07-03 RX ORDER — CLINDAMYCIN HYDROCHLORIDE 300 MG/1
300 CAPSULE ORAL 4 TIMES DAILY
Qty: 40 CAPSULE | Refills: 0 | Status: SHIPPED | OUTPATIENT
Start: 2023-07-03 | End: 2023-07-13

## 2023-07-03 RX ORDER — IBUPROFEN 600 MG/1
600 TABLET ORAL EVERY 6 HOURS PRN
Qty: 30 TABLET | Refills: 0 | Status: SHIPPED | OUTPATIENT
Start: 2023-07-03

## 2023-07-03 RX ORDER — ACETAMINOPHEN 325 MG/1
650 TABLET ORAL ONCE
Status: COMPLETED | OUTPATIENT
Start: 2023-07-03 | End: 2023-07-03

## 2023-07-03 RX ORDER — CLINDAMYCIN HYDROCHLORIDE 150 MG/1
300 CAPSULE ORAL ONCE
Status: COMPLETED | OUTPATIENT
Start: 2023-07-03 | End: 2023-07-03

## 2023-07-03 RX ADMIN — ACETAMINOPHEN 650 MG: 325 TABLET ORAL at 14:34

## 2023-07-03 RX ADMIN — CLINDAMYCIN HYDROCHLORIDE 300 MG: 150 CAPSULE ORAL at 14:34

## 2023-08-15 ENCOUNTER — OFFICE VISIT (OUTPATIENT)
Dept: FAMILY MEDICINE CLINIC | Facility: CLINIC | Age: 48
End: 2023-08-15

## 2023-08-15 VITALS
BODY MASS INDEX: 36.61 KG/M2 | RESPIRATION RATE: 18 BRPM | DIASTOLIC BLOOD PRESSURE: 62 MMHG | SYSTOLIC BLOOD PRESSURE: 118 MMHG | WEIGHT: 247.2 LBS | TEMPERATURE: 97.8 F | HEIGHT: 69 IN | HEART RATE: 64 BPM | OXYGEN SATURATION: 99 %

## 2023-08-15 DIAGNOSIS — M25.50 POLYARTHRALGIA: ICD-10-CM

## 2023-08-15 DIAGNOSIS — Z12.31 BREAST CANCER SCREENING BY MAMMOGRAM: ICD-10-CM

## 2023-08-15 DIAGNOSIS — G89.29 OTHER CHRONIC PAIN: ICD-10-CM

## 2023-08-15 DIAGNOSIS — R20.2 PARESTHESIA: ICD-10-CM

## 2023-08-15 DIAGNOSIS — Z11.4 ENCOUNTER FOR SCREENING FOR HIV: ICD-10-CM

## 2023-08-15 DIAGNOSIS — Z11.59 ENCOUNTER FOR HEPATITIS C SCREENING TEST FOR LOW RISK PATIENT: ICD-10-CM

## 2023-08-15 DIAGNOSIS — Z12.4 CERVICAL CANCER SCREENING: ICD-10-CM

## 2023-08-15 DIAGNOSIS — E66.9 OBESITY, UNSPECIFIED CLASSIFICATION, UNSPECIFIED OBESITY TYPE, UNSPECIFIED WHETHER SERIOUS COMORBIDITY PRESENT: Primary | ICD-10-CM

## 2023-08-15 PROCEDURE — 99214 OFFICE O/P EST MOD 30 MIN: CPT | Performed by: NURSE PRACTITIONER

## 2023-08-15 NOTE — PROGRESS NOTES
Name: Ginger Lopez      : 1975      MRN: [id number]  Encounter Provider: GAMALIEL Orta  Encounter Date: 8/15/2023   Encounter department: [facility] [facility]     1. Obesity, unspecified classification, unspecified obesity type, unspecified whether serious comorbidity present  -     CBC and differential; Future  -     Comprehensive metabolic panel; Future  -     Hemoglobin A1C; Future  -     Lipid panel; Future  -     TSH, 3rd generation with Free T4 reflex; Future    2. Cervical cancer screening  -     Ambulatory Referral to Obstetrics / Gynecology; Future    3. Encounter for screening for HIV  -     : HIV 1/2 AB/AG w Reflex [facility] for 2 yr old and above; Future    4. Encounter for hepatitis C screening test for low risk patient  -     Hepatitis C antibody; Future    5. Other chronic pain  -     XR spine cervical complete 4 or 5 vw non injury; Future; Expected date: 08/15/2023  -     XR knee 3 vw right non injury; Future; Expected date: 08/15/2023  -     XR spine thoracic 3 vw; Future; Expected date: 08/15/2023  -     XR spine lumbar minimum 4 views non injury; Future; Expected date: 08/15/2023    6. Polyarthralgia  -     Vitamin D 25 hydroxy; Future  -     ROSALINDA Screen w/ Reflex to Titer/Pattern; Future  -     RF Screen w/ Reflex to Titer; Future    7. Paresthesia  -     Vitamin B12; Future    8. Breast cancer screening by mammogram  -     Mammo screening bilateral w 3d & cad; Future; Expected date: 08/15/2023      BMI Counseling: Body mass index is 36.51 kg/m². The BMI is above normal. Nutrition recommendations include decreasing portion sizes, encouraging healthy choices of fruits and vegetables, decreasing fast food intake, consuming healthier snacks and limiting drinks that contain sugar. Rationale for BMI follow-up plan is due to patient being overweight or obese.          Darin Holbrook is a 56 y.o. female who  has no past medical history on file. who presented to the office today for follow up. Reports irregular menses, hot flashes, joint pain particularly bilaterral knee pain, and total back pain. Denies numbness/ tingling, incontinence, falls, weakness, fever, saddle anesthesia.        The following portions of the patient's history were reviewed and updated as appropriate: allergies, current medications, past family history, past medical history, past social history, past surgical history and problem list.    Review of Systems   Constitutional: Positive for diaphoresis. Negative for chills and fever. HENT: Negative for ear pain and sore throat. Eyes: Negative for pain and visual disturbance. Respiratory: Negative for cough and shortness of breath. Cardiovascular: Negative for chest pain and palpitations. Gastrointestinal: Negative for abdominal pain and vomiting. Genitourinary: Positive for menstrual problem. Negative for dysuria and hematuria. Musculoskeletal: Positive for arthralgias, back pain and myalgias. Skin: Negative for color change and rash. Neurological: Negative for seizures and syncope. All other systems reviewed and are negative. No past medical history on file. Past Surgical History:   Procedure Laterality Date   • BREAST SURGERY       No family history on file.   Social History     Socioeconomic History   • Marital status: Single     Spouse name: None   • Number of children: None   • Years of education: None   • Highest education level: None   Occupational History   • None   Tobacco Use   • Smoking status: Every Day     Packs/day: 1.00     Types: Cigarettes   • Smokeless tobacco: Never   Vaping Use   • Vaping Use: Never used   Substance and Sexual Activity   • Alcohol use: Not Currently   • Drug use: Never   • Sexual activity: None   Other Topics Concern   • None   Social History Narrative   • None     Social Determinants of Health     Financial Resource Strain: High Risk (11/30/2022)    Overall Financial Resource Strain (CARDIA)    • Difficulty of Paying Living Expenses: Hard   Food Insecurity: No Food Insecurity (11/30/2022)    Hunger Vital Sign    • Worried About Running Out of Food in the Last Year: Never true    • Ran Out of Food in the Last Year: Never true   Transportation Needs: No Transportation Needs (11/30/2022)    PRAPARE - Transportation    • Lack of Transportation (Medical): No    • Lack of Transportation (Non-Medical): No   Physical Activity: Not on file   Stress: Not on file   Social Connections: Not on file   Intimate Partner Violence: Not on file   Housing Stability: Not on file     Current Outpatient Medications on File Prior to Visit   Medication Sig   • acetaminophen (TYLENOL) 650 mg CR tablet Take 1 tablet (650 mg total) by mouth every 8 (eight) hours as needed for mild pain or moderate pain   • buPROPion (WELLBUTRIN) 75 mg tablet Take 1 tablet (75 mg total) by mouth 2 (two) times a day   • hydrOXYzine HCL (ATARAX) 25 mg tablet Take 1 tablet (25 mg total) by mouth every 6 (six) hours as needed for anxiety   • ibuprofen (MOTRIN) 600 mg tablet Take 1 tablet (600 mg total) by mouth every 6 (six) hours as needed for moderate pain   • naproxen (NAPROSYN) 500 mg tablet Take 1 tablet (500 mg total) by mouth 2 (two) times a day as needed for mild pain or moderate pain   • nicotine (NICODERM CQ) 21 mg/24 hr TD 24 hr patch Place 1 patch on the skin every 24 hours     No Known Allergies    There is no immunization history on file for this patient. Objective     /62 (BP Location: Left arm, Patient Position: Sitting, Cuff Size: Standard)   Pulse 64   Temp 97.8 °F (36.6 °C) (Temporal)   Resp 18   Ht 5' 9" (1.753 m)   Wt 112 kg (247 lb 3.2 oz)   SpO2 99%   BMI 36.51 kg/m²     Physical Exam  Vitals and nursing note reviewed. Constitutional:       General: She is not in acute distress. Appearance: She is well-developed. She is not diaphoretic.    HENT: Head: Normocephalic and atraumatic. Right Ear: External ear normal.      Left Ear: External ear normal.   Eyes:      Conjunctiva/sclera: Conjunctivae normal.      Pupils: Pupils are equal, round, and reactive to light. Cardiovascular:      Rate and Rhythm: Normal rate and regular rhythm. Pulmonary:      Effort: Pulmonary effort is normal. No respiratory distress. Breath sounds: Normal breath sounds. No wheezing. Abdominal:      General: Bowel sounds are normal. There is no distension. Palpations: Abdomen is soft. Tenderness: There is no abdominal tenderness. Musculoskeletal:         General: No deformity. Cervical back: Normal range of motion and neck supple. Lumbar back: Tenderness present. Positive right straight leg raise test and positive left straight leg raise test.      Right knee: Tenderness present. Left knee: Tenderness present. Lymphadenopathy:      Cervical: No cervical adenopathy. Skin:     General: Skin is warm and dry. Capillary Refill: Capillary refill takes less than 2 seconds. Findings: No rash. Neurological:      Mental Status: She is alert and oriented to person, place, and time.    Psychiatric:         Behavior: Behavior normal.       Matthew Organ

## 2024-01-31 ENCOUNTER — HOSPITAL ENCOUNTER (OUTPATIENT)
Dept: MAMMOGRAPHY | Facility: MEDICAL CENTER | Age: 49
Discharge: HOME | End: 2024-01-31
Payer: MEDICARE

## 2024-01-31 VITALS — BODY MASS INDEX: 36.58 KG/M2 | HEIGHT: 69 IN | WEIGHT: 247 LBS

## 2024-01-31 DIAGNOSIS — Z12.31 BREAST CANCER SCREENING BY MAMMOGRAM: ICD-10-CM

## 2024-01-31 PROCEDURE — 77067 SCR MAMMO BI INCL CAD: CPT

## 2024-01-31 PROCEDURE — 77063 BREAST TOMOSYNTHESIS BI: CPT

## 2024-02-23 ENCOUNTER — APPOINTMENT (OUTPATIENT)
Dept: LAB | Facility: HOSPITAL | Age: 49
End: 2024-02-23
Payer: MEDICARE

## 2024-02-23 DIAGNOSIS — E66.9 OBESITY, UNSPECIFIED CLASSIFICATION, UNSPECIFIED OBESITY TYPE, UNSPECIFIED WHETHER SERIOUS COMORBIDITY PRESENT: ICD-10-CM

## 2024-02-23 DIAGNOSIS — Z11.4 ENCOUNTER FOR SCREENING FOR HIV: ICD-10-CM

## 2024-02-23 DIAGNOSIS — M25.50 POLYARTHRALGIA: ICD-10-CM

## 2024-02-23 DIAGNOSIS — Z11.59 ENCOUNTER FOR HEPATITIS C SCREENING TEST FOR LOW RISK PATIENT: ICD-10-CM

## 2024-02-23 DIAGNOSIS — R20.2 PARESTHESIA: ICD-10-CM

## 2024-02-23 LAB
25(OH)D3 SERPL-MCNC: 11.2 NG/ML (ref 30–100)
ALBUMIN SERPL BCP-MCNC: 3.8 G/DL (ref 3.5–5)
ALP SERPL-CCNC: 69 U/L (ref 34–104)
ALT SERPL W P-5'-P-CCNC: 11 U/L (ref 7–52)
ANA SER QL IA: NEGATIVE
ANION GAP SERPL CALCULATED.3IONS-SCNC: 2 MMOL/L
AST SERPL W P-5'-P-CCNC: 17 U/L (ref 13–39)
BASOPHILS # BLD AUTO: 0.05 THOUSANDS/ÂΜL (ref 0–0.1)
BASOPHILS NFR BLD AUTO: 1 % (ref 0–1)
BILIRUB SERPL-MCNC: 0.54 MG/DL (ref 0.2–1)
BUN SERPL-MCNC: 13 MG/DL (ref 5–25)
CALCIUM SERPL-MCNC: 8.8 MG/DL (ref 8.4–10.2)
CHLORIDE SERPL-SCNC: 107 MMOL/L (ref 96–108)
CHOLEST SERPL-MCNC: 172 MG/DL
CO2 SERPL-SCNC: 31 MMOL/L (ref 21–32)
CREAT SERPL-MCNC: 0.79 MG/DL (ref 0.6–1.3)
EOSINOPHIL # BLD AUTO: 0.33 THOUSAND/ÂΜL (ref 0–0.61)
EOSINOPHIL NFR BLD AUTO: 5 % (ref 0–6)
ERYTHROCYTE [DISTWIDTH] IN BLOOD BY AUTOMATED COUNT: 13.8 % (ref 11.6–15.1)
EST. AVERAGE GLUCOSE BLD GHB EST-MCNC: 108 MG/DL
GFR SERPL CREATININE-BSD FRML MDRD: 88 ML/MIN/1.73SQ M
GLUCOSE P FAST SERPL-MCNC: 90 MG/DL (ref 65–99)
HBA1C MFR BLD: 5.4 %
HCT VFR BLD AUTO: 43 % (ref 34.8–46.1)
HCV AB SER QL: NORMAL
HDLC SERPL-MCNC: 48 MG/DL
HGB BLD-MCNC: 14.1 G/DL (ref 11.5–15.4)
HIV 1+2 AB+HIV1 P24 AG SERPL QL IA: NORMAL
HIV 2 AB SERPL QL IA: NORMAL
HIV1 AB SERPL QL IA: NORMAL
HIV1 P24 AG SERPL QL IA: NORMAL
IMM GRANULOCYTES # BLD AUTO: 0.02 THOUSAND/UL (ref 0–0.2)
IMM GRANULOCYTES NFR BLD AUTO: 0 % (ref 0–2)
LDLC SERPL CALC-MCNC: 112 MG/DL (ref 0–100)
LYMPHOCYTES # BLD AUTO: 2.01 THOUSANDS/ÂΜL (ref 0.6–4.47)
LYMPHOCYTES NFR BLD AUTO: 27 % (ref 14–44)
MCH RBC QN AUTO: 29.9 PG (ref 26.8–34.3)
MCHC RBC AUTO-ENTMCNC: 32.8 G/DL (ref 31.4–37.4)
MCV RBC AUTO: 91 FL (ref 82–98)
MONOCYTES # BLD AUTO: 0.58 THOUSAND/ÂΜL (ref 0.17–1.22)
MONOCYTES NFR BLD AUTO: 8 % (ref 4–12)
NEUTROPHILS # BLD AUTO: 4.35 THOUSANDS/ÂΜL (ref 1.85–7.62)
NEUTS SEG NFR BLD AUTO: 59 % (ref 43–75)
NONHDLC SERPL-MCNC: 124 MG/DL
NRBC BLD AUTO-RTO: 0 /100 WBCS
PLATELET # BLD AUTO: 273 THOUSANDS/UL (ref 149–390)
PMV BLD AUTO: 10.3 FL (ref 8.9–12.7)
POTASSIUM SERPL-SCNC: 4.3 MMOL/L (ref 3.5–5.3)
PROT SERPL-MCNC: 7.3 G/DL (ref 6.4–8.4)
RBC # BLD AUTO: 4.71 MILLION/UL (ref 3.81–5.12)
RHEUMATOID FACT SER QL LA: NEGATIVE
SODIUM SERPL-SCNC: 140 MMOL/L (ref 135–147)
TRIGL SERPL-MCNC: 60 MG/DL
TSH SERPL DL<=0.05 MIU/L-ACNC: 0.99 UIU/ML (ref 0.45–4.5)
VIT B12 SERPL-MCNC: 274 PG/ML (ref 180–914)
WBC # BLD AUTO: 7.34 THOUSAND/UL (ref 4.31–10.16)

## 2024-02-23 PROCEDURE — 36415 COLL VENOUS BLD VENIPUNCTURE: CPT

## 2024-02-23 PROCEDURE — 86803 HEPATITIS C AB TEST: CPT

## 2024-02-23 PROCEDURE — 80053 COMPREHEN METABOLIC PANEL: CPT

## 2024-02-23 PROCEDURE — 80061 LIPID PANEL: CPT

## 2024-02-23 PROCEDURE — 86430 RHEUMATOID FACTOR TEST QUAL: CPT

## 2024-02-23 PROCEDURE — 84443 ASSAY THYROID STIM HORMONE: CPT

## 2024-02-23 PROCEDURE — 83036 HEMOGLOBIN GLYCOSYLATED A1C: CPT

## 2024-02-23 PROCEDURE — 85025 COMPLETE CBC W/AUTO DIFF WBC: CPT

## 2024-02-23 PROCEDURE — 86038 ANTINUCLEAR ANTIBODIES: CPT

## 2024-02-23 PROCEDURE — 82607 VITAMIN B-12: CPT

## 2024-02-23 PROCEDURE — 82306 VITAMIN D 25 HYDROXY: CPT

## 2024-02-23 PROCEDURE — 87389 HIV-1 AG W/HIV-1&-2 AB AG IA: CPT

## 2024-02-26 DIAGNOSIS — E55.9 VITAMIN D DEFICIENCY: Primary | ICD-10-CM

## 2024-02-26 DIAGNOSIS — E53.8 B12 DEFICIENCY: ICD-10-CM

## 2024-02-26 RX ORDER — ERGOCALCIFEROL 1.25 MG/1
50000 CAPSULE ORAL 2 TIMES WEEKLY
Qty: 16 CAPSULE | Refills: 0 | Status: SHIPPED | OUTPATIENT
Start: 2024-02-26

## 2024-03-04 ENCOUNTER — TELEPHONE (OUTPATIENT)
Dept: FAMILY MEDICINE CLINIC | Facility: CLINIC | Age: 49
End: 2024-03-04

## 2024-03-14 ENCOUNTER — HOSPITAL ENCOUNTER (OUTPATIENT)
Dept: MAMMOGRAPHY | Facility: CLINIC | Age: 49
End: 2024-03-14
Payer: MEDICARE

## 2024-03-14 VITALS — WEIGHT: 247 LBS | HEIGHT: 69 IN | BODY MASS INDEX: 36.58 KG/M2

## 2024-03-14 DIAGNOSIS — R92.8 ABNORMAL SCREENING MAMMOGRAM: ICD-10-CM

## 2024-03-14 PROCEDURE — 76642 ULTRASOUND BREAST LIMITED: CPT

## 2024-03-14 PROCEDURE — G0279 TOMOSYNTHESIS, MAMMO: HCPCS

## 2024-03-14 PROCEDURE — 77065 DX MAMMO INCL CAD UNI: CPT

## 2024-03-19 ENCOUNTER — OFFICE VISIT (OUTPATIENT)
Dept: FAMILY MEDICINE CLINIC | Facility: CLINIC | Age: 49
End: 2024-03-19

## 2024-03-19 VITALS
TEMPERATURE: 96.1 F | SYSTOLIC BLOOD PRESSURE: 120 MMHG | HEART RATE: 61 BPM | HEIGHT: 69 IN | BODY MASS INDEX: 37.62 KG/M2 | DIASTOLIC BLOOD PRESSURE: 82 MMHG | RESPIRATION RATE: 18 BRPM | WEIGHT: 254 LBS | OXYGEN SATURATION: 98 %

## 2024-03-19 DIAGNOSIS — Z00.01 ENCOUNTER FOR GENERAL ADULT MEDICAL EXAMINATION WITH ABNORMAL FINDINGS: Primary | ICD-10-CM

## 2024-03-19 DIAGNOSIS — F33.9 DEPRESSION, RECURRENT (HCC): ICD-10-CM

## 2024-03-19 DIAGNOSIS — Z12.11 COLON CANCER SCREENING: ICD-10-CM

## 2024-03-19 DIAGNOSIS — Z72.0 TOBACCO ABUSE: ICD-10-CM

## 2024-03-19 DIAGNOSIS — E66.01 CLASS 2 SEVERE OBESITY DUE TO EXCESS CALORIES WITH SERIOUS COMORBIDITY AND BODY MASS INDEX (BMI) OF 37.0 TO 37.9 IN ADULT (HCC): ICD-10-CM

## 2024-03-19 DIAGNOSIS — E53.8 B12 DEFICIENCY: ICD-10-CM

## 2024-03-19 DIAGNOSIS — G47.00 INSOMNIA, UNSPECIFIED TYPE: ICD-10-CM

## 2024-03-19 DIAGNOSIS — K21.9 GASTROESOPHAGEAL REFLUX DISEASE WITHOUT ESOPHAGITIS: ICD-10-CM

## 2024-03-19 DIAGNOSIS — R79.89 LOW VITAMIN D LEVEL: ICD-10-CM

## 2024-03-19 DIAGNOSIS — Z12.4 CERVICAL CANCER SCREENING: ICD-10-CM

## 2024-03-19 PROCEDURE — 99214 OFFICE O/P EST MOD 30 MIN: CPT | Performed by: NURSE PRACTITIONER

## 2024-03-19 PROCEDURE — 99396 PREV VISIT EST AGE 40-64: CPT | Performed by: NURSE PRACTITIONER

## 2024-03-19 PROCEDURE — 96372 THER/PROPH/DIAG INJ SC/IM: CPT | Performed by: NURSE PRACTITIONER

## 2024-03-19 RX ORDER — BUPROPION HYDROCHLORIDE 75 MG/1
75 TABLET ORAL 2 TIMES DAILY
Qty: 60 TABLET | Refills: 2 | Status: SHIPPED | OUTPATIENT
Start: 2024-03-19

## 2024-03-19 RX ORDER — BUPROPION HYDROCHLORIDE 75 MG/1
75 TABLET ORAL 2 TIMES DAILY
Qty: 60 TABLET | Refills: 0 | Status: CANCELLED | OUTPATIENT
Start: 2024-03-19

## 2024-03-19 RX ORDER — CYANOCOBALAMIN 1000 UG/ML
1000 INJECTION, SOLUTION INTRAMUSCULAR; SUBCUTANEOUS
Status: DISCONTINUED | OUTPATIENT
Start: 2024-03-19 | End: 2024-03-20

## 2024-03-19 RX ADMIN — CYANOCOBALAMIN 1000 MCG: 1000 INJECTION, SOLUTION INTRAMUSCULAR; SUBCUTANEOUS at 12:32

## 2024-03-19 NOTE — PROGRESS NOTES
ADULT ANNUAL PHYSICAL  [facility] PRACT[facility]    NAME: Kari Puente  AGE: 49 y.o. SEX: female  : 1975     DATE: 3/20/2024     Assessment and Plan:     Problem List Items Addressed This Visit          Digestive    Gastroesophageal reflux disease without esophagitis     Diet controlled, -Discussed diet and lifestyle interventions to improve sx including: avoidance of common triggers (chocolate, caffeine, tomatoes, citrus), eat small meals frequently, remain upright after meals               Behavioral Health    Tobacco abuse     Previously tried bupropion and reports it was helpful, but stopped taking it after going on a trip and forgetting the medication.   Would like to restart.   Not interested in patches.         Relevant Medications    buPROPion (WELLBUTRIN) 75 mg tablet    Depression, recurrent (HCC)     Positive for mild depression, denies SI or self injury   Will restart bupropion for mood and tobacco cessation assistance, discussed expected outcomes and reviewed possible side effects of medication  Follow up in one month for medication adjustment as needed     PHQ-2/9 Depression Screening    Little interest or pleasure in doing things: 0 - not at all  Feeling down, depressed, or hopeless: 1 - several days  Trouble falling or staying asleep, or sleeping too much: 3 - nearly every day  Feeling tired or having little energy: 0 - not at all  Poor appetite or overeating: 3 - nearly every day  Feeling bad about yourself - or that you are a failure or have let yourself or your family down: 0 - not at all  Trouble concentrating on things, such as reading the newspaper or watching television: 0 - not at all  Moving or speaking so slowly that other people could have noticed. Or the opposite - being so fidgety or restless that you have been moving around a lot more than usual: 0 - not at all  Thoughts that you would be better off dead, or of  hurting yourself in some way: 0 - not at all  PHQ-9 Score: 7  PHQ-9 Interpretation: Mild depression                Relevant Medications    buPROPion (WELLBUTRIN) 75 mg tablet       Other    Obesity     Wt Readings from Last 3 Encounters:   03/19/24 115 kg (254 lb)   03/14/24 112 kg (247 lb)   01/31/24 112 kg (247 lb)     -Encouraged diet and lifestyle changes: decrease processed foods (cakes, cookies, chips, soda), decrease total carbohydrate intake, decrease fried/fatty foods, increase fruits and vegetables, increase lean proteins (chicken, turkey), increase healthy fats (avocado, fish, nuts), drink plenty of water (at least four 16 oz bottles per day)           Relevant Orders    Ambulatory Referral to Sleep Medicine    B12 deficiency     B12 level was low normal, given symptoms will give one of one B12 injection in office today and start on oral supplement          Relevant Medications    cyanocobalamin (VITAMIN B-12) 1000 MCG tablet    Low vitamin D level     Recent vitamin D level 11.2 - she is taking ergocalciferol twice weekly x 8 weeks   Can then transition to daily cholecalciferol and retest level           Other Visit Diagnoses       Encounter for general adult medical examination with abnormal findings    -  Primary    Colon cancer screening        Relevant Orders    Ambulatory Referral to Gastroenterology    Cervical cancer screening        Relevant Orders    Ambulatory Referral to Obstetrics / Gynecology    Insomnia, unspecified type        Relevant Orders    Ambulatory Referral to Sleep Medicine            Immunizations and preventive care screenings were discussed with patient today. Appropriate education was printed on patient's after visit summary.    Counseling:  Alcohol/drug use: discussed moderation in alcohol intake, the recommendations for healthy alcohol use, and avoidance of illicit drug use.  Dental Health: discussed importance of regular tooth brushing, flossing, and dental visits.  Injury  prevention: discussed safety/seat belts, safety helmets, smoke detectors, carbon dioxide detectors, and smoking near bedding or upholstery.  Sexual health: discussed sexually transmitted diseases, partner selection, use of condoms, avoidance of unintended pregnancy, and contraceptive alternatives.  Exercise: the importance of regular exercise/physical activity was discussed. Recommend exercise 3-5 times per week for at least 30 minutes.     BMI Counseling: Body mass index is 37.51 kg/m². The BMI is above normal. Nutrition recommendations include decreasing portion sizes, encouraging healthy choices of fruits and vegetables, decreasing fast food intake, consuming healthier snacks, limiting drinks that contain sugar, moderation in carbohydrate intake, increasing intake of lean protein, reducing intake of saturated and trans fat and reducing intake of cholesterol. Exercise recommendations include moderate physical activity 150 minutes/week. No pharmacotherapy was ordered. Patient referred to PCP. Rationale for BMI follow-up plan is due to patient being overweight or obese.     Depression Screening and Follow-up Plan: Patient's depression screening was positive with a PHQ-9 score of 7. Patient assessed for underlying major depression. Brief counseling provided and recommend additional follow-up/re-evaluation next office visit. Depression likely due to other medical condition. Will treat underlying condition.     Tobacco Cessation Counseling: Tobacco cessation counseling was provided. The patient is sincerely urged to quit consumption of tobacco. She is ready to quit tobacco. Medication options and side effects of medication discussed. Patient agreed to medication. Bupropion SR was prescribed.     Restarting bupropion for mood and tobacco cessation. Also treating underlying vitamin deficiencies which can contribute to depressed mood and referred to sleep medicine due to poor sleep quality and risk for MONI.     Return in  about 4 weeks (around 4/16/2024) for new medication .     Chief Complaint:     Chief Complaint   Patient presents with    Annual Exam      History of Present Illness:     Adult Annual Physical     Kari Triana is a 49 y.o. female who  has PMH of obesity, tobacco use, and depression who presented to the office today for follow up and physical. Also recently had lab work completed and so here to review results.         The following portions of the patient's history were reviewed and updated as appropriate: allergies, current medications, past family history, past medical history, past social history, past surgical history and problem list.      Diet and Physical Activity  Diet/Nutrition: low fat diet and low carb diet.   Exercise: walking.      Depression Screening  PHQ-2/9 Depression Screening    Little interest or pleasure in doing things: 0 - not at all  Feeling down, depressed, or hopeless: 1 - several days  Trouble falling or staying asleep, or sleeping too much: 3 - nearly every day  Feeling tired or having little energy: 0 - not at all  Poor appetite or overeating: 3 - nearly every day  Feeling bad about yourself - or that you are a failure or have let yourself or your family down: 0 - not at all  Trouble concentrating on things, such as reading the newspaper or watching television: 0 - not at all  Moving or speaking so slowly that other people could have noticed. Or the opposite - being so fidgety or restless that you have been moving around a lot more than usual: 0 - not at all  Thoughts that you would be better off dead, or of hurting yourself in some way: 0 - not at all  PHQ-9 Score: 7  PHQ-9 Interpretation: Mild depression       General Health  Sleep: sleeps poorly, gets 4-6 hours of sleep on average, snores loudly, and unrefreshing sleep.   Hearing:  reports tinnitus .  Vision: no vision problems.   Dental: no dental visits for >1 year and brushes teeth twice daily.       /GYN  Health  Follows with gynecology? no   Patient is: still menstruating  Last menstrual period: 3/18/24  Contraceptive method:  none .    Advanced Care Planning  Do you have an advanced directive? no  Do you have a durable medical power of ? no  ACP document given to the patient? no     Review of Systems:     Review of Systems   Constitutional:  Negative for activity change, chills and fever.   HENT:  Negative for congestion, ear pain and sore throat.    Eyes:  Negative for pain and visual disturbance.   Respiratory:  Negative for cough and shortness of breath.    Cardiovascular:  Negative for chest pain and palpitations.   Gastrointestinal:  Negative for abdominal pain and vomiting.   Genitourinary:  Negative for dysuria and hematuria.   Musculoskeletal:  Negative for arthralgias and back pain.   Skin:  Negative for color change and rash.   Neurological:  Negative for seizures and syncope.   Psychiatric/Behavioral:  Positive for dysphoric mood and sleep disturbance. Negative for self-injury and suicidal ideas.    All other systems reviewed and are negative.       Past Medical History:     No past medical history on file.   Past Surgical History:     Past Surgical History:   Procedure Laterality Date    BREAST SURGERY Left     2019 in [place]      Social History:     Social History     Socioeconomic History    Marital status: Single     Spouse name: None    Number of children: None    Years of education: None    Highest education level: None   Occupational History    None   Tobacco Use    Smoking status: Every Day     Current packs/day: 1.00     Types: Cigarettes    Smokeless tobacco: Never   Vaping Use    Vaping status: Never Used   Substance and Sexual Activity    Alcohol use: Not Currently    Drug use: Never    Sexual activity: None   Other Topics Concern    None   Social History Narrative    None     Social Determinants of Health     Financial Resource Strain: Low Risk  (3/19/2024)    Overall Financial  Resource Strain (CARDIA)     Difficulty of Paying Living Expenses: Not hard at all   Food Insecurity: No Food Insecurity (3/19/2024)    Hunger Vital Sign     Worried About Running Out of Food in the Last Year: Never true     Ran Out of Food in the Last Year: Never true   Transportation Needs: No Transportation Needs (3/19/2024)    PRAPARE - Transportation     Lack of Transportation (Medical): No     Lack of Transportation (Non-Medical): No   Physical Activity: Not on file   Stress: Not on file   Social Connections: Not on file   Intimate Partner Violence: Not on file   Housing Stability: Unknown (3/19/2024)    Housing Stability Vital Sign     Unable to Pay for Housing in the Last Year: No     Number of Places Lived in the Last Year: Not on file     Unstable Housing in the Last Year: No      Family History:     Family History   Problem Relation Age of Onset    No Known Problems Mother     No Known Problems Father     No Known Problems Sister     No Known Problems Sister     No Known Problems Sister     No Known Problems Sister     No Known Problems Sister     No Known Problems Sister     No Known Problems Sister     No Known Problems Sister     No Known Problems Daughter     No Known Problems Maternal Grandmother     No Known Problems Maternal Grandfather     No Known Problems Paternal Grandmother     No Known Problems Paternal Grandfather     No Known Problems Maternal Aunt     No Known Problems Maternal Aunt     No Known Problems Maternal Aunt     No Known Problems Maternal Aunt     No Known Problems Maternal Aunt     No Known Problems Maternal Aunt     Breast cancer Neg Hx       Current Medications:     Current Outpatient Medications   Medication Sig Dispense Refill    acetaminophen (TYLENOL) 650 mg CR tablet Take 1 tablet (650 mg total) by mouth every 8 (eight) hours as needed for mild pain or moderate pain 30 tablet 0    buPROPion (WELLBUTRIN) 75 mg tablet Take 1 tablet (75 mg total) by mouth 2 (two) times a day  "60 tablet 2    cyanocobalamin (VITAMIN B-12) 1000 MCG tablet Take 1 tablet (1,000 mcg total) by mouth daily 90 tablet 3    ergocalciferol (VITAMIN D2) 50,000 units Take 1 capsule (50,000 Units total) by mouth 2 (two) times a week 16 capsule 0    naproxen (NAPROSYN) 500 mg tablet Take 1 tablet (500 mg total) by mouth 2 (two) times a day as needed for mild pain or moderate pain 30 tablet 0     No current facility-administered medications for this visit.      Allergies:     No Known Allergies   Physical Exam:     /82 (BP Location: Right arm, Patient Position: Sitting, Cuff Size: Large)   Pulse 61   Temp (!) 96.1 °F (35.6 °C) (Temporal)   Resp 18   Ht 5' 9\" (1.753 m)   Wt 115 kg (254 lb)   LMP 03/04/2024   SpO2 98%   BMI 37.51 kg/m²     Physical Exam  Vitals and nursing note reviewed.   Constitutional:       General: She is not in acute distress.     Appearance: She is well-developed. She is obese.   HENT:      Head: Normocephalic and atraumatic.      Right Ear: Tympanic membrane, ear canal and external ear normal.      Left Ear: Tympanic membrane, ear canal and external ear normal.      Nose: Nose normal.      Mouth/Throat:      Mouth: Mucous membranes are moist.      Pharynx: Oropharynx is clear.   Eyes:      Conjunctiva/sclera: Conjunctivae normal.      Pupils: Pupils are equal, round, and reactive to light.   Cardiovascular:      Rate and Rhythm: Normal rate and regular rhythm.      Heart sounds: No murmur heard.  Pulmonary:      Effort: Pulmonary effort is normal. No respiratory distress.      Breath sounds: Normal breath sounds.   Abdominal:      Palpations: Abdomen is soft.      Tenderness: There is no abdominal tenderness.   Musculoskeletal:         General: No swelling.      Cervical back: Neck supple.   Skin:     General: Skin is warm and dry.      Capillary Refill: Capillary refill takes less than 2 seconds.   Neurological:      General: No focal deficit present.      Mental Status: She is alert " and oriented to person, place, and time.   Psychiatric:         Mood and Affect: Mood normal.         Behavior: Behavior normal.         Thought Content: Thought content normal.         Judgment: Judgment normal.          GAMALIEL Carreno  [facility]     40 y/o F a&ox3 walked in from front triage c/o 5/10 abdominal cramping. Midline lower abdominal pain stated yesterday around 3 pm. Pain worsened over night and saw PCP today was told to come to ER for rule out "pancreatitis/ ovarian torsion" as per patient. + nausea since last night, denies vomiting. denies burning, itchiness, or frequency on urination. no hematuria. LBM today, normal to patient pattern. LMP last week. denies fevers, chills, SOB, chest pain, dizziness, weakness.

## 2024-03-19 NOTE — ASSESSMENT & PLAN NOTE
Wt Readings from Last 3 Encounters:   03/19/24 115 kg (254 lb)   03/14/24 112 kg (247 lb)   01/31/24 112 kg (247 lb)     -Encouraged diet and lifestyle changes: decrease processed foods (cakes, cookies, chips, soda), decrease total carbohydrate intake, decrease fried/fatty foods, increase fruits and vegetables, increase lean proteins (chicken, turkey), increase healthy fats (avocado, fish, nuts), drink plenty of water (at least four 16 oz bottles per day)

## 2024-03-19 NOTE — ASSESSMENT & PLAN NOTE
Previously tried bupropion and reports it was helpful, but stopped taking it after going on a trip and forgetting the medication.   Would like to restart.   Not interested in patches.

## 2024-03-19 NOTE — ASSESSMENT & PLAN NOTE
Positive for mild depression, denies SI or self injury   Will restart bupropion for mood and tobacco cessation assistance, discussed expected outcomes and reviewed possible side effects of medication  Follow up in one month for medication adjustment as needed     PHQ-2/9 Depression Screening    Little interest or pleasure in doing things: 0 - not at all  Feeling down, depressed, or hopeless: 1 - several days  Trouble falling or staying asleep, or sleeping too much: 3 - nearly every day  Feeling tired or having little energy: 0 - not at all  Poor appetite or overeating: 3 - nearly every day  Feeling bad about yourself - or that you are a failure or have let yourself or your family down: 0 - not at all  Trouble concentrating on things, such as reading the newspaper or watching television: 0 - not at all  Moving or speaking so slowly that other people could have noticed. Or the opposite - being so fidgety or restless that you have been moving around a lot more than usual: 0 - not at all  Thoughts that you would be better off dead, or of hurting yourself in some way: 0 - not at all  PHQ-9 Score: 7  PHQ-9 Interpretation: Mild depression

## 2024-03-20 PROBLEM — E53.8 B12 DEFICIENCY: Status: ACTIVE | Noted: 2024-03-20

## 2024-03-20 PROBLEM — B34.9 ACUTE VIRAL SYNDROME: Status: RESOLVED | Noted: 2022-11-30 | Resolved: 2024-03-20

## 2024-03-20 PROBLEM — R79.89 LOW VITAMIN D LEVEL: Status: ACTIVE | Noted: 2024-03-20

## 2024-03-20 NOTE — ASSESSMENT & PLAN NOTE
Diet controlled, -Discussed diet and lifestyle interventions to improve sx including: avoidance of common triggers (chocolate, caffeine, tomatoes, citrus), eat small meals frequently, remain upright after meals

## 2024-03-20 NOTE — ASSESSMENT & PLAN NOTE
Recent vitamin D level 11.2 - she is taking ergocalciferol twice weekly x 8 weeks   Can then transition to daily cholecalciferol and retest level

## 2024-03-20 NOTE — ASSESSMENT & PLAN NOTE
B12 level was low normal, given symptoms will give one of one B12 injection in office today and start on oral supplement

## 2024-04-03 DIAGNOSIS — Z72.0 TOBACCO ABUSE: ICD-10-CM

## 2024-04-04 RX ORDER — BUPROPION HYDROCHLORIDE 75 MG/1
75 TABLET ORAL 2 TIMES DAILY
Qty: 180 TABLET | Refills: 1 | Status: SHIPPED | OUTPATIENT
Start: 2024-04-04

## 2024-04-15 NOTE — ED PROVIDER NOTES
Adderall 10 mg and Adderall XR 20 mg refill.  Orders pended.  Advise.  KpavelRN   History  Chief Complaint   Patient presents with   • Dental Pain     Patient states her L upper tooth were she has a root canal has been hurting since this morning. Used oragel with no relief. Pt with dental pain , at same tooth as root canal 2-3 months ago,  New dental pain for several days       Dental Pain  Location:  Upper  Upper teeth location:  10/ERNA lateral incisor  Quality:  Aching  Severity:  Mild  Onset quality:  Gradual  Duration:  2 days  Timing:  Constant  Chronicity:  New  Context: not abscess    Relieved by:  Nothing  Worsened by:  Nothing  Ineffective treatments:  None tried  Associated symptoms: no congestion    Risk factors: no alcohol problem        Prior to Admission Medications   Prescriptions Last Dose Informant Patient Reported? Taking?   acetaminophen (TYLENOL) 650 mg CR tablet   No No   Sig: Take 1 tablet (650 mg total) by mouth every 8 (eight) hours as needed for mild pain or moderate pain   buPROPion (WELLBUTRIN) 75 mg tablet   No No   Sig: Take 1 tablet (75 mg total) by mouth 2 (two) times a day   hydrOXYzine HCL (ATARAX) 25 mg tablet   No No   Sig: Take 1 tablet (25 mg total) by mouth every 6 (six) hours as needed for anxiety   naproxen (NAPROSYN) 500 mg tablet   No No   Sig: Take 1 tablet (500 mg total) by mouth 2 (two) times a day as needed for mild pain or moderate pain   nicotine (NICODERM CQ) 21 mg/24 hr TD 24 hr patch   No No   Sig: Place 1 patch on the skin every 24 hours      Facility-Administered Medications: None       History reviewed. No pertinent past medical history. Past Surgical History:   Procedure Laterality Date   • BREAST SURGERY         History reviewed. No pertinent family history. I have reviewed and agree with the history as documented.     E-Cigarette/Vaping   • E-Cigarette Use Never User      E-Cigarette/Vaping Substances     Social History     Tobacco Use   • Smoking status: Every Day     Packs/day: 1.00     Types: Cigarettes   • Smokeless tobacco: Never   Vaping Use   • Vaping Use: Never used   Substance Use Topics   • Alcohol use: Not Currently   • Drug use: Never       Review of Systems   Constitutional: Negative. HENT: Negative. Negative for congestion. Eyes: Negative. Respiratory: Negative. Cardiovascular: Negative. Gastrointestinal: Negative. Endocrine: Negative. Genitourinary: Negative. Musculoskeletal: Negative. Skin: Negative. Allergic/Immunologic: Negative. Neurological: Negative. Hematological: Negative. Psychiatric/Behavioral: Negative. All other systems reviewed and are negative. Physical Exam  Physical Exam  Vitals and nursing note reviewed. Constitutional:       Appearance: Normal appearance. She is normal weight. HENT:      Head: Normocephalic and atraumatic. Right Ear: Tympanic membrane, ear canal and external ear normal.      Left Ear: Tympanic membrane, ear canal and external ear normal.      Nose: Nose normal.      Mouth/Throat:      Mouth: Mucous membranes are moist.      Pharynx: Oropharynx is clear. Comments: Left upper incisor tenderness  Gum pain and swelling facial pain  No facial swelling   Eyes:      Extraocular Movements: Extraocular movements intact. Conjunctiva/sclera: Conjunctivae normal.      Pupils: Pupils are equal, round, and reactive to light. Cardiovascular:      Rate and Rhythm: Normal rate and regular rhythm. Pulses: Normal pulses. Heart sounds: Normal heart sounds. Pulmonary:      Effort: Pulmonary effort is normal.      Breath sounds: Normal breath sounds. Abdominal:      General: Abdomen is flat. Bowel sounds are normal.      Palpations: Abdomen is soft. Musculoskeletal:         General: Normal range of motion. Cervical back: Normal range of motion and neck supple. Skin:     General: Skin is warm. Capillary Refill: Capillary refill takes less than 2 seconds. Neurological:      General: No focal deficit present. Mental Status: She is alert and oriented to person, place, and time. Psychiatric:         Mood and Affect: Mood normal.         Vital Signs  ED Triage Vitals   Temperature Pulse Respirations Blood Pressure SpO2   07/03/23 1421 07/03/23 1416 07/03/23 1416 07/03/23 1416 07/03/23 1416   98.4 °F (36.9 °C) 64 16 138/82 98 %      Temp Source Heart Rate Source Patient Position - Orthostatic VS BP Location FiO2 (%)   07/03/23 1421 07/03/23 1416 07/03/23 1416 07/03/23 1416 --   Tympanic Monitor Sitting Left arm       Pain Score       07/03/23 1434       7           Vitals:    07/03/23 1416   BP: 138/82   Pulse: 64   Patient Position - Orthostatic VS: Sitting         Visual Acuity      ED Medications  Medications   acetaminophen (TYLENOL) tablet 650 mg (650 mg Oral Given 7/3/23 1434)   clindamycin (CLEOCIN) capsule 300 mg (300 mg Oral Given 7/3/23 1434)       Diagnostic Studies  Results Reviewed     None                 No orders to display              Procedures  Procedures         ED Course                               SBIRT 20yo+    Flowsheet Row Most Recent Value   Initial Alcohol Screen: US AUDIT-C     1. How often do you have a drink containing alcohol? 0 Filed at: 07/03/2023 1419   2. How many drinks containing alcohol do you have on a typical day you are drinking? 0 Filed at: 07/03/2023 1419   3a. Male UNDER 65: How often do you have five or more drinks on one occasion? 0 Filed at: 07/03/2023 1419   3b. FEMALE Any Age, or MALE 65+: How often do you have 4 or more drinks on one occassion? 0 Filed at: 07/03/2023 1419   Audit-C Score 0 Filed at: 07/03/2023 1419   JOJO: How many times in the past year have you. .. Used an illegal drug or used a prescription medication for non-medical reasons?  Never Filed at: 07/03/2023 1419                    MDM    Disposition  Final diagnoses:   Pain, dental     Time reflects when diagnosis was documented in both MDM as applicable and the Disposition within this note     Time User Action Codes Description Comment    7/3/2023  2:27 PM Balta Douglas. Add [K08.89] Pain, dental       ED Disposition     ED Disposition   Discharge    Condition   Stable    Date/Time   Mon Jul 3, 2023  2:27 PM    Comment   Kari Prado Courser discharge to home/self care. Follow-up Information     Follow up With Specialties Details Why [address]    [address]          Discharge Medication List as of 7/3/2023  2:39 PM      START taking these medications    Details   clindamycin (CLEOCIN) 300 MG capsule Take 1 capsule (300 mg total) by mouth 4 (four) times a day for 10 days, Starting Mon 7/3/2023, Until Thu 7/13/2023, Print      ibuprofen (MOTRIN) 600 mg tablet Take 1 tablet (600 mg total) by mouth every 6 (six) hours as needed for moderate pain, Starting Mon 7/3/2023, Print         CONTINUE these medications which have NOT CHANGED    Details   acetaminophen (TYLENOL) 650 mg CR tablet Take 1 tablet (650 mg total) by mouth every 8 (eight) hours as needed for mild pain or moderate pain, Starting Mon 9/27/2021, Normal      buPROPion (WELLBUTRIN) 75 mg tablet Take 1 tablet (75 mg total) by mouth 2 (two) times a day, Starting Fri 8/26/2022, Normal      hydrOXYzine HCL (ATARAX) 25 mg tablet Take 1 tablet (25 mg total) by mouth every 6 (six) hours as needed for anxiety, Starting Mon 2/15/2021, Normal      naproxen (NAPROSYN) 500 mg tablet Take 1 tablet (500 mg total) by mouth 2 (two) times a day as needed for mild pain or moderate pain, Starting Sat 7/30/2022, Normal      nicotine (NICODERM CQ) 21 mg/24 hr TD 24 hr patch Place 1 patch on the skin every 24 hours, Starting Fri 8/26/2022, Normal             No discharge procedures on file.     PDMP Review     None          ED Provider  Electronically Signed by           Yaakov [email or web address]  07/03/23 8155 Detail Level: Detailed Render In Strict Bullet Format?: No Initiate Treatment: Efudex bid x 12 days to scalp \\nHc for irritation

## 2024-04-23 ENCOUNTER — TELEPHONE (OUTPATIENT)
Dept: OBGYN CLINIC | Facility: CLINIC | Age: 49
End: 2024-04-23

## 2024-06-06 ENCOUNTER — OFFICE VISIT (OUTPATIENT)
Dept: FAMILY MEDICINE CLINIC | Facility: CLINIC | Age: 49
End: 2024-06-06

## 2024-06-06 ENCOUNTER — HOSPITAL ENCOUNTER (OUTPATIENT)
Dept: RADIOLOGY | Facility: HOSPITAL | Age: 49
End: 2024-06-06
Payer: MEDICARE

## 2024-06-06 VITALS
DIASTOLIC BLOOD PRESSURE: 74 MMHG | HEIGHT: 69 IN | WEIGHT: 264 LBS | SYSTOLIC BLOOD PRESSURE: 108 MMHG | TEMPERATURE: 98.1 F | RESPIRATION RATE: 19 BRPM | HEART RATE: 75 BPM | OXYGEN SATURATION: 97 % | BODY MASS INDEX: 39.1 KG/M2

## 2024-06-06 DIAGNOSIS — G57.11 MERALGIA PARAESTHETICA, RIGHT: ICD-10-CM

## 2024-06-06 DIAGNOSIS — R60.0 BILATERAL LOWER EXTREMITY EDEMA: ICD-10-CM

## 2024-06-06 DIAGNOSIS — G89.29 CHRONIC PAIN OF RIGHT KNEE: ICD-10-CM

## 2024-06-06 DIAGNOSIS — M25.561 CHRONIC PAIN OF RIGHT KNEE: ICD-10-CM

## 2024-06-06 DIAGNOSIS — M25.561 CHRONIC PAIN OF RIGHT KNEE: Primary | ICD-10-CM

## 2024-06-06 DIAGNOSIS — G89.29 CHRONIC PAIN OF RIGHT KNEE: Primary | ICD-10-CM

## 2024-06-06 PROCEDURE — 73564 X-RAY EXAM KNEE 4 OR MORE: CPT

## 2024-06-06 PROCEDURE — 99214 OFFICE O/P EST MOD 30 MIN: CPT | Performed by: FAMILY MEDICINE

## 2024-06-06 PROCEDURE — G2211 COMPLEX E/M VISIT ADD ON: HCPCS | Performed by: FAMILY MEDICINE

## 2024-06-06 RX ORDER — GABAPENTIN 300 MG/1
300 CAPSULE ORAL
Qty: 30 CAPSULE | Refills: 1 | Status: SHIPPED | OUTPATIENT
Start: 2024-06-06
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
## 2024-06-10 ENCOUNTER — HOSPITAL ENCOUNTER (OUTPATIENT)
Dept: NON INVASIVE DIAGNOSTICS | Facility: HOSPITAL | Age: 49
Discharge: HOME | End: 2024-06-10
Attending: FAMILY MEDICINE
Payer: MEDICARE

## 2024-06-10 DIAGNOSIS — R60.0 BILATERAL LOWER EXTREMITY EDEMA: ICD-10-CM

## 2024-06-10 PROCEDURE — 93970 EXTREMITY STUDY: CPT

## 2024-06-11 PROCEDURE — 93970 EXTREMITY STUDY: CPT | Performed by: SURGERY

## 2024-07-30 ENCOUNTER — HOSPITAL ENCOUNTER (EMERGENCY)
Facility: HOSPITAL | Age: 49
Discharge: HOME | End: 2024-07-30
Payer: MEDICARE

## 2024-07-30 VITALS
SYSTOLIC BLOOD PRESSURE: 131 MMHG | DIASTOLIC BLOOD PRESSURE: 79 MMHG | HEART RATE: 74 BPM | OXYGEN SATURATION: 99 % | WEIGHT: 273.37 LBS | RESPIRATION RATE: 18 BRPM | TEMPERATURE: 98.3 F | BODY MASS INDEX: 40.37 KG/M2

## 2024-07-30 DIAGNOSIS — R60.0 BILATERAL LEG EDEMA: Primary | ICD-10-CM

## 2024-07-30 DIAGNOSIS — I87.2 VENOUS INSUFFICIENCY OF BOTH LOWER EXTREMITIES: ICD-10-CM

## 2024-07-30 PROCEDURE — 99284 EMERGENCY DEPT VISIT MOD MDM: CPT

## 2024-07-30 PROCEDURE — 99282 EMERGENCY DEPT VISIT SF MDM: CPT

## 2024-07-31 NOTE — DISCHARGE INSTRUCTIONS
Your evaluation suggests that your symptoms are due to a non emergent cause of your leg swelling most consistent with venous insufficiency.    Please follow up with your primary care physician within one week.    Use the compression stockings.    Return to the Emergency Department if you experience worsening or concerning symptoms, chest pain, or shortness of breath.    Thank you for choosing us for your care.

## 2024-07-31 NOTE — ED PROVIDER NOTES
History  Chief Complaint   Patient presents with    Foot Swelling     States for past month had L foot swelling, today R foot swelling started. States is painful now.     Patient is a 49-year-old female with no significant past medical history, presenting for evaluation of bilateral foot swelling.  Patient reportedly has had this for over a month now.  She was evaluated by her primary care physician when this first started and it was thought to be secondary to venous insufficiency.  She had a duplex ultrasound performed that was negative.  She was recommended to use compression stockings.  She says that she has been noncompliant with these.  She says that her symptoms seem to be worse at the end of the day after standing up and seem to resolve in the morning.  She denies any chest pain or difficulty breathing.  She denies any history of cardiac disease.  She has been urinating normally.  She is otherwise without complaint.        Prior to Admission Medications   Prescriptions Last Dose Informant Patient Reported? Taking?   acetaminophen (TYLENOL) 650 mg CR tablet   No No   Sig: Take 1 tablet (650 mg total) by mouth every 8 (eight) hours as needed for mild pain or moderate pain   buPROPion (WELLBUTRIN) 75 mg tablet   No No   Sig: TAKE 1 TABLET BY MOUTH TWICE A DAY   cyanocobalamin (VITAMIN B-12) 1000 MCG tablet   No No   Sig: Take 1 tablet (1,000 mcg total) by mouth daily   ergocalciferol (VITAMIN D2) 50,000 units   No No   Sig: Take 1 capsule (50,000 Units total) by mouth 2 (two) times a week   gabapentin (Neurontin) 300 mg capsule   No No   Sig: Take 1 capsule (300 mg total) by mouth daily at bedtime   naproxen (NAPROSYN) 500 mg tablet   No No   Sig: Take 1 tablet (500 mg total) by mouth 2 (two) times a day as needed for mild pain or moderate pain      Facility-Administered Medications: None       History reviewed. No pertinent past medical history.    Past Surgical History:   Procedure Laterality Date    BREAST  SURGERY Left     2019 in [place]       Family History   Problem Relation Age of Onset    No Known Problems Mother     No Known Problems Father     No Known Problems Sister     No Known Problems Sister     No Known Problems Sister     No Known Problems Sister     No Known Problems Sister     No Known Problems Sister     No Known Problems Sister     No Known Problems Sister     No Known Problems Daughter     No Known Problems Maternal Grandmother     No Known Problems Maternal Grandfather     No Known Problems Paternal Grandmother     No Known Problems Paternal Grandfather     No Known Problems Maternal Aunt     No Known Problems Maternal Aunt     No Known Problems Maternal Aunt     No Known Problems Maternal Aunt     No Known Problems Maternal Aunt     No Known Problems Maternal Aunt     Breast cancer Neg Hx      I have reviewed and agree with the history as documented.    E-Cigarette/Vaping    E-Cigarette Use Never User      E-Cigarette/Vaping Substances     Social History     Tobacco Use    Smoking status: Every Day     Current packs/day: 1.00     Types: Cigarettes    Smokeless tobacco: Never   Vaping Use    Vaping status: Never Used   Substance Use Topics    Alcohol use: Not Currently    Drug use: Never       Review of Systems   Respiratory:  Negative for shortness of breath.    Cardiovascular:  Positive for leg swelling. Negative for chest pain.   Gastrointestinal:  Negative for abdominal pain.       Physical Exam  Physical Exam  Vitals and nursing note reviewed.   Constitutional:       General: She is not in acute distress.     Appearance: Normal appearance. She is not ill-appearing or toxic-appearing.   HENT:      Head: Normocephalic and atraumatic.      Right Ear: External ear normal.      Left Ear: External ear normal.      Nose: Nose normal.   Eyes:      General: No scleral icterus.        Right eye: No discharge.         Left eye: No discharge.      Extraocular Movements: Extraocular movements intact.       Conjunctiva/sclera: Conjunctivae normal.   Cardiovascular:      Rate and Rhythm: Normal rate.      Heart sounds: Normal heart sounds. No murmur heard.     No friction rub. No gallop.   Pulmonary:      Effort: Pulmonary effort is normal. No respiratory distress.      Breath sounds: Normal breath sounds.   Abdominal:      General: Abdomen is flat. There is no distension.      Palpations: Abdomen is soft. There is no mass.      Tenderness: There is no abdominal tenderness.   Genitourinary:     Comments: Deferred  Musculoskeletal:      Right lower leg: Edema present.      Left lower leg: Edema present.      Comments: Trace pitting edema of the bilateral lower extremities   Skin:     General: Skin is warm and dry.   Neurological:      General: No focal deficit present.      Mental Status: She is alert.         Vital Signs  ED Triage Vitals [07/30/24 2027]   Temperature Pulse Respirations Blood Pressure SpO2   98.3 °F (36.8 °C) 74 18 131/79 99 %      Temp Source Heart Rate Source Patient Position - Orthostatic VS BP Location FiO2 (%)   Oral Monitor Sitting Right arm --      Pain Score       7           Vitals:    07/30/24 2027   BP: 131/79   Pulse: 74   Patient Position - Orthostatic VS: Sitting         Visual Acuity      ED Medications  Medications - No data to display    Diagnostic Studies  Results Reviewed       None                   No orders to display              Procedures  Procedures         ED Course                                 SBIRT 20yo+      Flowsheet Row Most Recent Value   Initial Alcohol Screen: US AUDIT-C     1. How often do you have a drink containing alcohol? 0 Filed at: 07/30/2024 2050   2. How many drinks containing alcohol do you have on a typical day you are drinking?  0 Filed at: 07/30/2024 2050   3a. Male UNDER 65: How often do you have five or more drinks on one occasion? 0 Filed at: 07/30/2024 2050   3b. FEMALE Any Age, or MALE 65+: How often do you have 4 or more drinks on one occassion? 0  Filed at: 07/30/2024 2050   Audit-C Score 0 Filed at: 07/30/2024 2050   JOJO: How many times in the past year have you...    Used an illegal drug or used a prescription medication for non-medical reasons? Never Filed at: 07/30/2024 2050                      Medical Decision Making  Patient with history as above presented with bilateral leg swelling. History obtained from patient.    Differential diagnosis includes: Venous insufficiency, lymphedema    Plan: Compression stockings, reassurance    Patient well-appearing with no evidence of volume overload.  Suspect that her symptoms are likely secondary to venous insufficiency.  She had previous duplex that was negative.  She has been noncompliant with compression stockings.  Recommend patient start using compression stockings for suspected venous insufficiency.  Reassessed the patient and they continue to be well appearing. Stable for outpatient management.    Disposition: Discharged with instructions to obtain outpatient follow up of patient's symptoms and findings, with strict return precautions if patient develops new or worsening symptoms. Patient understands this plan and is agreeable. All questions answered. Patient discharged home with return precautions.                 Disposition  Final diagnoses:   Bilateral leg edema   Venous insufficiency of both lower extremities     Time reflects when diagnosis was documented in both MDM as applicable and the Disposition within this note       Time User Action Codes Description Comment    7/30/2024  9:00 PM Shane Potter Add [R60.0] Bilateral leg edema     7/30/2024  9:01 PM Shane Potter Add [I87.2] Venous insufficiency of both lower extremities           ED Disposition       ED Disposition   Discharge    Condition   Stable    Date/Time   Tue Jul 30, 2024 2100    Comment   Kari Puente discharge to home/self care.                   Follow-up Information       Follow up With Specialties Details Why  Contact Info    [facility], Nurse Practitioner Go in 1 week  [address] [place] [address]  [phone]              Discharge Medication List as of 7/30/2024  9:02 PM        CONTINUE these medications which have NOT CHANGED    Details   acetaminophen (TYLENOL) 650 mg CR tablet Take 1 tablet (650 mg total) by mouth every 8 (eight) hours as needed for mild pain or moderate pain, Starting Mon 9/27/2021, Normal      buPROPion (WELLBUTRIN) 75 mg tablet TAKE 1 TABLET BY MOUTH TWICE A DAY, Starting Thu 4/4/2024, Normal      cyanocobalamin (VITAMIN B-12) 1000 MCG tablet Take 1 tablet (1,000 mcg total) by mouth daily, Starting Wed 3/20/2024, Normal      ergocalciferol (VITAMIN D2) 50,000 units Take 1 capsule (50,000 Units total) by mouth 2 (two) times a week, Starting Mon 2/26/2024, Normal      gabapentin (Neurontin) 300 mg capsule Take 1 capsule (300 mg total) by mouth daily at bedtime, Starting Thu 6/6/2024, Normal      naproxen (NAPROSYN) 500 mg tablet Take 1 tablet (500 mg total) by mouth 2 (two) times a day as needed for mild pain or moderate pain, Starting Sat 7/30/2022, Normal             No discharge procedures on file.    PDMP Review       None            ED Provider  Electronically Signed by             Shane Potter DO  07/31/24 0219

## 2024-08-15 ENCOUNTER — TELEPHONE (OUTPATIENT)
Age: 49
End: 2024-08-15

## 2024-08-15 NOTE — TELEPHONE ENCOUNTER
Contacted patient off of Medication Management  and NON-REFERRAL to verify needs of services in attempts to offer patient an appointment. LVM for patient to contact intake dept  in regards to wait list.

## 2024-09-19 ENCOUNTER — HOSPITAL ENCOUNTER (OUTPATIENT)
Dept: MAMMOGRAPHY | Facility: CLINIC | Age: 49
End: 2024-09-19
Payer: MEDICARE

## 2024-09-19 VITALS — WEIGHT: 273 LBS | BODY MASS INDEX: 40.43 KG/M2 | HEIGHT: 69 IN

## 2024-09-19 DIAGNOSIS — R92.8 ABNORMAL MAMMOGRAM: ICD-10-CM

## 2024-09-19 PROCEDURE — 77065 DX MAMMO INCL CAD UNI: CPT

## 2024-09-19 PROCEDURE — G0279 TOMOSYNTHESIS, MAMMO: HCPCS

## 2024-10-14 ENCOUNTER — OFFICE VISIT (OUTPATIENT)
Dept: OBGYN CLINIC | Facility: MEDICAL CENTER | Age: 49
End: 2024-10-14
Payer: MEDICARE

## 2024-10-14 VITALS
BODY MASS INDEX: 39.84 KG/M2 | HEIGHT: 69 IN | SYSTOLIC BLOOD PRESSURE: 104 MMHG | DIASTOLIC BLOOD PRESSURE: 70 MMHG | HEART RATE: 74 BPM | WEIGHT: 269 LBS

## 2024-10-14 DIAGNOSIS — M17.11 PRIMARY OSTEOARTHRITIS OF RIGHT KNEE: Primary | ICD-10-CM

## 2024-10-14 DIAGNOSIS — Z12.11 ENCOUNTER FOR SCREENING COLONOSCOPY: ICD-10-CM

## 2024-10-14 PROCEDURE — 99203 OFFICE O/P NEW LOW 30 MIN: CPT | Performed by: ORTHOPAEDIC SURGERY

## 2024-10-14 PROCEDURE — 20610 DRAIN/INJ JOINT/BURSA W/O US: CPT | Performed by: ORTHOPAEDIC SURGERY

## 2024-10-14 RX ORDER — METHYLPREDNISOLONE ACETATE 40 MG/ML
1 INJECTION, SUSPENSION INTRA-ARTICULAR; INTRALESIONAL; INTRAMUSCULAR; SOFT TISSUE
Status: COMPLETED | OUTPATIENT
Start: 2024-10-14 | End: 2024-10-14

## 2024-10-14 RX ORDER — LIDOCAINE HYDROCHLORIDE 10 MG/ML
3 INJECTION, SOLUTION INFILTRATION; PERINEURAL
Status: COMPLETED | OUTPATIENT
Start: 2024-10-14 | End: 2024-10-14

## 2024-10-14 RX ADMIN — LIDOCAINE HYDROCHLORIDE 3 ML: 10 INJECTION, SOLUTION INFILTRATION; PERINEURAL at 12:30

## 2024-10-14 RX ADMIN — METHYLPREDNISOLONE ACETATE 1 ML: 40 INJECTION, SUSPENSION INTRA-ARTICULAR; INTRALESIONAL; INTRAMUSCULAR; SOFT TISSUE at 12:30

## 2024-10-14 NOTE — PROGRESS NOTES
Ortho Sports Medicine Knee New Patient Visit     Assesment:   49 y.o. female right knee primary osteoarthritis worse with     Plan:    Kari presents today for evaluation for right knee pain. X-rays were reviewed and discussed with the patient. I explained she does have some moderate patellofemoral compartment degenerative changes and her pain is consistent with this type of osteoarthritis. She does have pain over the medial joint line that could be associated with a meniscus tear. We discussed at this time treating the primary osteoarthritis. I provided her with a cortisone injection at today's visit. She tolerated the procedure well. We discussed doing an at home exercise program to help strengthen the knee. Patient was in agreement with this plan. Follow in 2-3 months or as needed.    Conservative treatment:    Ice to knee for 20 minutes at least 1-2 times daily.  OTC NSAIDS prn for pain.  Tylenol for pain.  Let pain guide gradual return activities.    Emergency room notes reviewed    Imaging:    All imaging from today was reviewed by myself and explained to the patient.       Injection:    The risks and benefits of the injection (which include but are not limited to: infection, bleeding,damage to nerve/artery, need for further intervention), as well as the risks and benefits of all alternative treatments were explained and understood.  The patient elected to proceed with injection.  The procedure was done with aseptic technique, and the patient tolerated the procedure well with no complications.  A corticosteroid injection was performed.      Surgery:     No surgery is recommended at this point, continue with conservative treatment plan as noted.      Follow up:    No follow-ups on file.        Chief Complaint   Patient presents with    Right Knee - Pain       History of Present Illness:    The patient is a 49 y.o. female who presents today for evaluation for right knee pain. She reports having pain in the  knee that started in June. She does not remember any inciting injury to the knee. Her pain started in the back of the knee with migrating symptoms to the medial aspect of the knee. She has the most pain with going up and down stairs, squatting and walking for prolonged periods of time. She feels like this has started to affect her ability to perform daily exercise. She denies any previous injury to her right knee. She has not had any formal treatment for her right knee.        Knee Surgical History:  None    Past Medical, Social and Family History:  History reviewed. No pertinent past medical history.  Past Surgical History:   Procedure Laterality Date    BREAST SURGERY Left     2019 in [place]     No Known Allergies  Current Outpatient Medications on File Prior to Visit   Medication Sig Dispense Refill    acetaminophen (TYLENOL) 650 mg CR tablet Take 1 tablet (650 mg total) by mouth every 8 (eight) hours as needed for mild pain or moderate pain 30 tablet 0    buPROPion (WELLBUTRIN) 75 mg tablet TAKE 1 TABLET BY MOUTH TWICE A DAY (Patient not taking: Reported on 10/14/2024) 180 tablet 1    cyanocobalamin (VITAMIN B-12) 1000 MCG tablet Take 1 tablet (1,000 mcg total) by mouth daily (Patient not taking: Reported on 10/14/2024) 90 tablet 3    ergocalciferol (VITAMIN D2) 50,000 units Take 1 capsule (50,000 Units total) by mouth 2 (two) times a week (Patient not taking: Reported on 10/14/2024) 16 capsule 0    gabapentin (Neurontin) 300 mg capsule Take 1 capsule (300 mg total) by mouth daily at bedtime (Patient not taking: Reported on 10/14/2024) 30 capsule 1    naproxen (NAPROSYN) 500 mg tablet Take 1 tablet (500 mg total) by mouth 2 (two) times a day as needed for mild pain or moderate pain (Patient not taking: Reported on 10/14/2024) 30 tablet 0     No current facility-administered medications on file prior to visit.     Social History     Socioeconomic History    Marital status: Single     Spouse name: Not on file     Number of children: Not on file    Years of education: Not on file    Highest education level: Not on file   Occupational History    Not on file   Tobacco Use    Smoking status: Every Day     Current packs/day: 1.00     Types: Cigarettes    Smokeless tobacco: Never   Vaping Use    Vaping status: Never Used   Substance and Sexual Activity    Alcohol use: Not Currently    Drug use: Never    Sexual activity: Not on file   Other Topics Concern    Not on file   Social History Narrative    Not on file     Social Determinants of Health     Financial Resource Strain: Low Risk  (3/19/2024)    Overall Financial Resource Strain (CARDIA)     Difficulty of Paying Living Expenses: Not hard at all   Food Insecurity: No Food Insecurity (3/19/2024)    Hunger Vital Sign     Worried About Running Out of Food in the Last Year: Never true     Ran Out of Food in the Last Year: Never true   Transportation Needs: No Transportation Needs (3/19/2024)    PRAPARE - Transportation     Lack of Transportation (Medical): No     Lack of Transportation (Non-Medical): No   Physical Activity: Not on file   Stress: Not on file   Social Connections: Not on file   Intimate Partner Violence: Not on file   Housing Stability: Unknown (3/19/2024)    Housing Stability Vital Sign     Unable to Pay for Housing in the Last Year: No     Number of Times Moved in the Last Year: Not on file     Homeless in the Last Year: No         I have reviewed the past medical, surgical, social and family history, medications and allergies as documented in the EMR.    Review of systems: ROS is negative other than that noted in the HPI.  Constitutional: Negative for fatigue and fever.   HENT: Negative for sore throat.    Respiratory: Negative for shortness of breath.    Cardiovascular: Negative for chest pain.   Gastrointestinal: Negative for abdominal pain.   Endocrine: Negative for cold intolerance and heat intolerance.   Genitourinary: Negative for flank pain.  "  Musculoskeletal: Negative for back pain.   Skin: Negative for rash.   Allergic/Immunologic: Negative for immunocompromised state.   Neurological: Negative for dizziness.   Psychiatric/Behavioral: Negative for agitation.      Physical Exam:    Blood pressure 104/70, pulse 74, height 5' 9\" (1.753 m), weight 122 kg (269 lb), not currently breastfeeding.    General/Constitutional: NAD, well developed, well nourished  HENT: Normocephalic, atraumatic  CV: Intact distal pulses, regular rate  Resp: No respiratory distress or labored breathing  GI: Soft and non-tender   Lymphatic: No lymphadenopathy palpated  Neuro: Alert and Oriented x 3, no focal deficits  Psych: Normal mood, normal affect, normal judgement, normal behavior  Skin: Warm, dry, no rashes, no erythema      Knee Exam (focused):                RIGHT LEFT   ROM:   0-130 0-130   Palpation: Effusion negative negative     MJL tenderness Positive Negative     LJL tenderness Positive Negative   Meniscus: Samantha Negative Negative    Apley's Compression Negative Negative   Instability: Varus stable stable     Valgus stable stable   Special Tests: Lachman Negative Negative     Posterior drawer Negative Negative     Anterior drawer Negative Negative     Pivot shift not tested not tested     Dial not tested not tested   Patella: Palpation no tenderness no tenderness     Mobility 1/4 1/4     Apprehension Negative Negative   Other: Single leg 1/4 squat not tested not tested      LE NV Exam: +2 DP/PT pulses bilaterally  Sensation intact to light touch L2-S1 bilaterally     Bilateral hip ROM demonstrates no pain actively or passively    No calf tenderness to palpation bilaterally    Knee Imaging    X-rays of the right knee were reviewed, which demonstrate mild medial and lateral compartment degenerative changes and moderate patellofemoral compartment degenerative changes.  I have reviewed the radiology report and agree with their impression.    Large joint arthrocentesis: " R knee  Universal Protocol:  Consent: Verbal consent obtained.  Risks and benefits: risks, benefits and alternatives were discussed  Consent given by: patient  Patient understanding: patient states understanding of the procedure being performed  Patient consent: the patient's understanding of the procedure matches consent given  Site marked: the operative site was marked  Patient identity confirmed: verbally with patient  Supporting Documentation  Indications: pain   Procedure Details  Location: knee - R knee  Needle size: 22 G  Ultrasound guidance: no  Approach: anterolateral  Medications administered: 3 mL lidocaine 1 %; 1 mL methylPREDNISolone acetate 40 mg/mL    Patient tolerance: patient tolerated the procedure well with no immediate complications  Dressing:  Sterile dressing applied           Scribe Attestation      I,:  Nehemiah Tompkins am acting as a scribe while in the presence of the attending physician.:       I,:  Pito Acosta DO personally performed the services described in this documentation    as scribed in my presence.:

## 2025-02-24 ENCOUNTER — HOSPITAL ENCOUNTER (EMERGENCY)
Facility: HOSPITAL | Age: 50
Discharge: HOME | End: 2025-02-24
Attending: EMERGENCY MEDICINE
Payer: MEDICARE

## 2025-02-24 ENCOUNTER — APPOINTMENT (EMERGENCY)
Dept: CT IMAGING | Facility: HOSPITAL | Age: 50
End: 2025-02-24
Payer: MEDICARE

## 2025-02-24 ENCOUNTER — OFFICE VISIT (OUTPATIENT)
Dept: URGENT CARE | Age: 50
End: 2025-02-24
Payer: MEDICARE

## 2025-02-24 VITALS
TEMPERATURE: 97.8 F | SYSTOLIC BLOOD PRESSURE: 100 MMHG | HEART RATE: 52 BPM | DIASTOLIC BLOOD PRESSURE: 64 MMHG | RESPIRATION RATE: 16 BRPM | BODY MASS INDEX: 40.4 KG/M2 | OXYGEN SATURATION: 99 % | WEIGHT: 273.59 LBS

## 2025-02-24 VITALS
RESPIRATION RATE: 18 BRPM | DIASTOLIC BLOOD PRESSURE: 84 MMHG | OXYGEN SATURATION: 99 % | TEMPERATURE: 96.7 F | HEART RATE: 68 BPM | SYSTOLIC BLOOD PRESSURE: 132 MMHG

## 2025-02-24 DIAGNOSIS — R11.0 NAUSEA: Primary | ICD-10-CM

## 2025-02-24 DIAGNOSIS — J01.90 ACUTE SINUSITIS: ICD-10-CM

## 2025-02-24 DIAGNOSIS — R51.9 ACUTE INTRACTABLE HEADACHE, UNSPECIFIED HEADACHE TYPE: ICD-10-CM

## 2025-02-24 DIAGNOSIS — R51.9 HEADACHE: Primary | ICD-10-CM

## 2025-02-24 LAB
ALBUMIN SERPL BCG-MCNC: 4.2 G/DL (ref 3.5–5)
ALP SERPL-CCNC: 71 U/L (ref 34–104)
ALT SERPL W P-5'-P-CCNC: 17 U/L (ref 7–52)
ANION GAP SERPL CALCULATED.3IONS-SCNC: 4 MMOL/L (ref 4–13)
AST SERPL W P-5'-P-CCNC: 21 U/L (ref 13–39)
BASOPHILS # BLD AUTO: 0.06 THOUSANDS/ÂΜL (ref 0–0.1)
BASOPHILS NFR BLD AUTO: 1 % (ref 0–1)
BILIRUB SERPL-MCNC: 0.23 MG/DL (ref 0.2–1)
BUN SERPL-MCNC: 12 MG/DL (ref 5–25)
CALCIUM SERPL-MCNC: 9.4 MG/DL (ref 8.4–10.2)
CHLORIDE SERPL-SCNC: 104 MMOL/L (ref 96–108)
CO2 SERPL-SCNC: 30 MMOL/L (ref 21–32)
CREAT SERPL-MCNC: 0.78 MG/DL (ref 0.6–1.3)
EOSINOPHIL # BLD AUTO: 0.43 THOUSAND/ÂΜL (ref 0–0.61)
EOSINOPHIL NFR BLD AUTO: 5 % (ref 0–6)
ERYTHROCYTE [DISTWIDTH] IN BLOOD BY AUTOMATED COUNT: 13.2 % (ref 11.6–15.1)
EXT PREGNANCY TEST URINE: NEGATIVE
EXT. CONTROL: NORMAL
FLUAV AG UPPER RESP QL IA.RAPID: NEGATIVE
FLUBV AG UPPER RESP QL IA.RAPID: NEGATIVE
GFR SERPL CREATININE-BSD FRML MDRD: 89 ML/MIN/1.73SQ M
GLUCOSE SERPL-MCNC: 84 MG/DL (ref 65–140)
HCT VFR BLD AUTO: 43 % (ref 34.8–46.1)
HGB BLD-MCNC: 13.5 G/DL (ref 11.5–15.4)
IMM GRANULOCYTES # BLD AUTO: 0.03 THOUSAND/UL (ref 0–0.2)
IMM GRANULOCYTES NFR BLD AUTO: 0 % (ref 0–2)
LYMPHOCYTES # BLD AUTO: 3.37 THOUSANDS/ÂΜL (ref 0.6–4.47)
LYMPHOCYTES NFR BLD AUTO: 38 % (ref 14–44)
MCH RBC QN AUTO: 28.6 PG (ref 26.8–34.3)
MCHC RBC AUTO-ENTMCNC: 31.4 G/DL (ref 31.4–37.4)
MCV RBC AUTO: 91 FL (ref 82–98)
MONOCYTES # BLD AUTO: 0.65 THOUSAND/ÂΜL (ref 0.17–1.22)
MONOCYTES NFR BLD AUTO: 7 % (ref 4–12)
NEUTROPHILS # BLD AUTO: 4.4 THOUSANDS/ÂΜL (ref 1.85–7.62)
NEUTS SEG NFR BLD AUTO: 49 % (ref 43–75)
NRBC BLD AUTO-RTO: 0 /100 WBCS
PLATELET # BLD AUTO: 267 THOUSANDS/UL (ref 149–390)
PMV BLD AUTO: 10.2 FL (ref 8.9–12.7)
POTASSIUM SERPL-SCNC: 4.4 MMOL/L (ref 3.5–5.3)
PROT SERPL-MCNC: 7.6 G/DL (ref 6.4–8.4)
RBC # BLD AUTO: 4.72 MILLION/UL (ref 3.81–5.12)
SARS-COV+SARS-COV-2 AG RESP QL IA.RAPID: NEGATIVE
SODIUM SERPL-SCNC: 138 MMOL/L (ref 135–147)
WBC # BLD AUTO: 8.94 THOUSAND/UL (ref 4.31–10.16)

## 2025-02-24 PROCEDURE — 70450 CT HEAD/BRAIN W/O DYE: CPT

## 2025-02-24 PROCEDURE — 99203 OFFICE O/P NEW LOW 30 MIN: CPT | Performed by: PHYSICIAN ASSISTANT

## 2025-02-24 PROCEDURE — 96375 TX/PRO/DX INJ NEW DRUG ADDON: CPT

## 2025-02-24 PROCEDURE — 96365 THER/PROPH/DIAG IV INF INIT: CPT

## 2025-02-24 PROCEDURE — 87804 INFLUENZA ASSAY W/OPTIC: CPT

## 2025-02-24 PROCEDURE — 80053 COMPREHEN METABOLIC PANEL: CPT

## 2025-02-24 PROCEDURE — 81025 URINE PREGNANCY TEST: CPT

## 2025-02-24 PROCEDURE — 87811 SARS-COV-2 COVID19 W/OPTIC: CPT

## 2025-02-24 PROCEDURE — 85025 COMPLETE CBC W/AUTO DIFF WBC: CPT

## 2025-02-24 PROCEDURE — 36415 COLL VENOUS BLD VENIPUNCTURE: CPT

## 2025-02-24 PROCEDURE — 96366 THER/PROPH/DIAG IV INF ADDON: CPT

## 2025-02-24 PROCEDURE — 99284 EMERGENCY DEPT VISIT MOD MDM: CPT

## 2025-02-24 RX ORDER — ONDANSETRON 4 MG/1
4 TABLET, FILM COATED ORAL EVERY 8 HOURS PRN
Qty: 20 TABLET | Refills: 0 | Status: SHIPPED | OUTPATIENT
Start: 2025-02-24

## 2025-02-24 RX ORDER — METOCLOPRAMIDE HYDROCHLORIDE 5 MG/ML
10 INJECTION INTRAMUSCULAR; INTRAVENOUS ONCE
Status: COMPLETED | OUTPATIENT
Start: 2025-02-24 | End: 2025-02-24

## 2025-02-24 RX ORDER — MAGNESIUM SULFATE HEPTAHYDRATE 40 MG/ML
2 INJECTION, SOLUTION INTRAVENOUS ONCE
Status: COMPLETED | OUTPATIENT
Start: 2025-02-24 | End: 2025-02-24

## 2025-02-24 RX ORDER — DIPHENHYDRAMINE HYDROCHLORIDE 50 MG/ML
25 INJECTION INTRAMUSCULAR; INTRAVENOUS ONCE
Status: COMPLETED | OUTPATIENT
Start: 2025-02-24 | End: 2025-02-24

## 2025-02-24 RX ORDER — KETOROLAC TROMETHAMINE 30 MG/ML
30 INJECTION, SOLUTION INTRAMUSCULAR; INTRAVENOUS ONCE
Status: COMPLETED | OUTPATIENT
Start: 2025-02-24 | End: 2025-02-24

## 2025-02-24 RX ADMIN — SODIUM CHLORIDE 1000 ML: 0.9 INJECTION, SOLUTION INTRAVENOUS at 13:10

## 2025-02-24 RX ADMIN — MAGNESIUM SULFATE HEPTAHYDRATE 2 G: 40 INJECTION, SOLUTION INTRAVENOUS at 13:16

## 2025-02-24 RX ADMIN — KETOROLAC TROMETHAMINE 30 MG: 30 INJECTION, SOLUTION INTRAMUSCULAR; INTRAVENOUS at 13:10

## 2025-02-24 RX ADMIN — DIPHENHYDRAMINE HYDROCHLORIDE 25 MG: 50 INJECTION, SOLUTION INTRAMUSCULAR; INTRAVENOUS at 13:11

## 2025-02-24 RX ADMIN — METOCLOPRAMIDE 10 MG: 5 INJECTION, SOLUTION INTRAMUSCULAR; INTRAVENOUS at 13:13

## 2025-02-24 NOTE — ED PROVIDER NOTES
Time reflects when diagnosis was documented in both MDM as applicable and the Disposition within this note       Time User Action Codes Description Comment    2/24/2025  3:23 PM Tab Tyson Add [R51.9] Headache     2/24/2025  3:23 PM Tab Tyson [J01.90] Acute sinusitis           ED Disposition       ED Disposition   Discharge    Condition   Stable    Date/Time   Mon Feb 24, 2025  3:23 PM    Comment   Kari Puente discharge to home/self care.                   Assessment & Plan       Medical Decision Making  50-year-old female presents to the ED for evaluation of headache ongoing approximately 24 hours.  Was sent to the ED by urgent care for further evaluation and management.  Patient reports insidious onset of headache, no thunderclap sensation, denies any falls or trauma.  GCS of 15, benign neuroexam with no signs of focal deficits, patient ambulatory in the ED with a steady gait.  Labs acutely unremarkable.  CT head showed no acute intracranial abnormality.  Patient did report significant improvement in symptoms status post migraine cocktail.  Patient advised close follow-up with her PCP for further evaluation and management.  ED return precautions discussed with patient.  Patient verbalized understanding and agreement with plan.  See ED course for additional details.    Amount and/or Complexity of Data Reviewed  Labs: ordered.  Radiology: ordered. Decision-making details documented in ED Course.    Risk  Prescription drug management.        ED Course as of 02/24/25 1837   Mon Feb 24, 2025   1501 CT head wo contrast  IMPRESSION:     No acute intracranial abnormality.     Sphenoid sinus air-fluid level consistent with acute sinusitis.     1518 Patient reports significant improvement in symptoms at this time.   1522 CT head wo contrast  Patient does report greater than 10 days of worsening nasal congestion.  Will treat for acute sinusitis with Augmentin.       Medications   ketorolac (TORADOL)  injection 30 mg (30 mg Intravenous Given 2/24/25 1310)   metoclopramide (REGLAN) injection 10 mg (10 mg Intravenous Given 2/24/25 1313)   diphenhydrAMINE (BENADRYL) injection 25 mg (25 mg Intravenous Given 2/24/25 1311)   magnesium sulfate 2 g/50 mL IVPB (premix) 2 g (0 g Intravenous Stopped 2/24/25 1531)   sodium chloride 0.9 % bolus 1,000 mL (0 mL Intravenous Stopped 2/24/25 1441)       ED Risk Strat Scores                                                History of Present Illness       Chief Complaint   Patient presents with    Migraine     Pt reports she has undiagnosed migraines. States that she had had a migraine for the last 24 hours. Is also experiencing nausea and vomiting along with photosensitivity. Has been taking tylenol with no relief.        No past medical history on file.   Past Surgical History:   Procedure Laterality Date    BREAST SURGERY Left     2019 in [place]      Family History   Problem Relation Age of Onset    No Known Problems Mother     No Known Problems Father     No Known Problems Sister     No Known Problems Sister     No Known Problems Sister     No Known Problems Sister     No Known Problems Sister     No Known Problems Sister     No Known Problems Sister     No Known Problems Sister     No Known Problems Daughter     No Known Problems Maternal Grandmother     No Known Problems Maternal Grandfather     No Known Problems Paternal Grandmother     No Known Problems Paternal Grandfather     No Known Problems Maternal Aunt     No Known Problems Maternal Aunt     No Known Problems Maternal Aunt     No Known Problems Maternal Aunt     No Known Problems Maternal Aunt     No Known Problems Maternal Aunt     Breast cancer Neg Hx       Social History     Tobacco Use    Smoking status: Every Day     Current packs/day: 1.00     Types: Cigarettes    Smokeless tobacco: Never   Vaping Use    Vaping status: Never Used   Substance Use Topics    Alcohol use: Not Currently    Drug use: Never       E-Cigarette/Vaping    E-Cigarette Use Never User       E-Cigarette/Vaping Substances      I have reviewed and agree with the history as documented.     This is a 50-year-old female who presents to the ED for evaluation of headache.  Patient reports symptoms began yesterday, reports frontal nonradiating headache described as a throbbing sensation.  Reports headache had insidious onset, no thunderclap sensation.  Denies any changes in headache with positional changes.  Denies falls or trauma, is not on blood thinners.  Does endorse a history of headaches typically once every other month, has never been diagnosed with migraines.  Patient does report associated nausea and vomiting, states she did vomit approximately 4 times between yesterday and today.  Does report sensitivity to light, denies any double vision or loss of vision.  She denies any recent fevers, chills, neck pain, chest pain, SOB, abdominal pain, diarrhea, dysuria.      Migraine  Associated symptoms: headaches, nausea, rhinorrhea and vomiting    Associated symptoms: no abdominal pain, no chest pain, no cough, no diarrhea, no fever, no shortness of breath and no sore throat        Review of Systems   Constitutional:  Negative for chills and fever.   HENT:  Positive for rhinorrhea and sinus pressure. Negative for sore throat.    Eyes:  Negative for photophobia and visual disturbance.   Respiratory:  Negative for cough and shortness of breath.    Cardiovascular:  Negative for chest pain and palpitations.   Gastrointestinal:  Positive for nausea and vomiting. Negative for abdominal pain and diarrhea.   Genitourinary:  Negative for difficulty urinating, dysuria, flank pain and hematuria.   Musculoskeletal:  Negative for neck pain and neck stiffness.   Neurological:  Positive for headaches. Negative for dizziness, syncope, weakness, light-headedness and numbness.           Objective       ED Triage Vitals   Temperature Pulse Blood Pressure Respirations SpO2  Patient Position - Orthostatic VS   02/24/25 1130 02/24/25 1130 02/24/25 1130 02/24/25 1130 02/24/25 1130 02/24/25 1130   97.8 °F (36.6 °C) 61 138/63 17 98 % Sitting      Temp Source Heart Rate Source BP Location FiO2 (%) Pain Score    02/24/25 1130 02/24/25 1441 02/24/25 1130 -- 02/24/25 1310    Oral Monitor Right arm  9      Vitals      Date and Time Temp Pulse SpO2 Resp BP Pain Score FACES Pain Rating User   02/24/25 1441 -- 52 99 % 16 100/64 3 -- RM   02/24/25 1310 -- -- -- -- -- 9 -- RM   02/24/25 1130 97.8 °F (36.6 °C) 61 98 % 17 138/63 -- -- LP            Physical Exam  Vitals and nursing note reviewed.   Constitutional:       General: She is not in acute distress.     Appearance: She is well-developed. She is not diaphoretic.   HENT:      Head: Normocephalic and atraumatic.   Eyes:      General: No visual field deficit.     Conjunctiva/sclera: Conjunctivae normal.   Cardiovascular:      Rate and Rhythm: Normal rate and regular rhythm.      Heart sounds: No murmur heard.  Pulmonary:      Effort: Pulmonary effort is normal. No respiratory distress.      Breath sounds: Normal breath sounds.   Abdominal:      Palpations: Abdomen is soft.      Tenderness: There is no abdominal tenderness. There is no right CVA tenderness or left CVA tenderness.   Musculoskeletal:         General: No swelling.      Cervical back: Normal range of motion and neck supple. No rigidity.   Skin:     General: Skin is warm and dry.      Capillary Refill: Capillary refill takes less than 2 seconds.   Neurological:      General: No focal deficit present.      Mental Status: She is alert and oriented to person, place, and time.      GCS: GCS eye subscore is 4. GCS verbal subscore is 5. GCS motor subscore is 6.      Cranial Nerves: Cranial nerves 2-12 are intact. No cranial nerve deficit, dysarthria or facial asymmetry.      Sensory: Sensation is intact. No sensory deficit.      Motor: No pronator drift.      Coordination: Coordination is  intact. Finger-Nose-Finger Test and Heel to Shin Test normal.      Gait: Gait is intact. Gait (Ambulatory in the ED with a steady gait) normal.   Psychiatric:         Mood and Affect: Mood normal.         Results Reviewed       Procedure Component Value Units Date/Time    FLU/COVID Rapid Antigen (30 min. TAT) - Preferred screening test in ED [[id number]]  (Normal) Collected: 02/24/25 1442    Lab Status: Final result Specimen: Nares from Nose Updated: 02/24/25 1504     SARS COV Rapid Antigen Negative     Influenza A Rapid Antigen Negative     Influenza B Rapid Antigen Negative    Narrative:      This test has been performed using the [employer] Tiffanie 2 FLU+SARS Antigen test under the Emergency Use Authorization (EUA). This test has been validated by the  and verified by the performing laboratory. The Tiffanie uses lateral flow immunofluorescent sandwich assay to detect SARS-COV, Influenza A and Influenza B Antigen.     The [employer] Tiffanie 2 SARS Antigen test does not differentiate between SARS-CoV and SARS-CoV-2.     Negative results are presumptive and may be confirmed with a molecular assay, if necessary, for patient management. Negative results do not rule out SARS-CoV-2 or influenza infection and should not be used as the sole basis for treatment or patient management decisions. A negative test result may occur if the level of antigen in a sample is below the limit of detection of this test.     Positive results are indicative of the presence of viral antigens, but do not rule out bacterial infection or co-infection with other viruses.     All test results should be used as an adjunct to clinical observations and other information available to the provider.    FOR PEDIATRIC PATIENTS - copy/paste COVID Guidelines URL to browser: [email or web address]    Comprehensive metabolic panel [[id number]] Collected: 02/24/25 1310    Lab Status: Final result Specimen:  Blood from Arm, Left Updated: 02/24/25 1344     Sodium 138 mmol/L      Potassium 4.4 mmol/L      Chloride 104 mmol/L      CO2 30 mmol/L      ANION GAP 4 mmol/L      BUN 12 mg/dL      Creatinine 0.78 mg/dL      Glucose 84 mg/dL      Calcium 9.4 mg/dL      AST 21 U/L      ALT 17 U/L      Alkaline Phosphatase 71 U/L      Total Protein 7.6 g/dL      Albumin 4.2 g/dL      Total Bilirubin 0.23 mg/dL      eGFR 89 ml/min/1.73sq m     Narrative:      National Kidney Disease Foundation guidelines for Chronic Kidney Disease (CKD):     Stage 1 with normal or high GFR (GFR > 90 mL/min/1.73 square meters)    Stage 2 Mild CKD (GFR = 60-89 mL/min/1.73 square meters)    Stage 3A Moderate CKD (GFR = 45-59 mL/min/1.73 square meters)    Stage 3B Moderate CKD (GFR = 30-44 mL/min/1.73 square meters)    Stage 4 Severe CKD (GFR = 15-29 mL/min/1.73 square meters)    Stage 5 End Stage CKD (GFR <15 mL/min/1.73 square meters)  Note: GFR calculation is accurate only with a steady state creatinine    CBC and differential [[id number]] Collected: 02/24/25 1310    Lab Status: Final result Specimen: Blood from Arm, Left Updated: 02/24/25 1324     WBC 8.94 Thousand/uL      RBC 4.72 Million/uL      Hemoglobin 13.5 g/dL      Hematocrit 43.0 %      MCV 91 fL      MCH 28.6 pg      MCHC 31.4 g/dL      RDW 13.2 %      MPV 10.2 fL      Platelets 267 Thousands/uL      nRBC 0 /100 WBCs      Segmented % 49 %      Immature Grans % 0 %      Lymphocytes % 38 %      Monocytes % 7 %      Eosinophils Relative 5 %      Basophils Relative 1 %      Absolute Neutrophils 4.40 Thousands/µL      Absolute Immature Grans 0.03 Thousand/uL      Absolute Lymphocytes 3.37 Thousands/µL      Absolute Monocytes 0.65 Thousand/µL      Eosinophils Absolute 0.43 Thousand/µL      Basophils Absolute 0.06 Thousands/µL     POCT pregnancy, urine [[id number]]  (Normal) Collected: 02/24/25 1224    Lab Status: Final result Updated: 02/24/25 1227     EXT Preg Test, Ur Negative     Control Valid             CT head wo contrast   Final Interpretation by Bebo East MD (02/24 1326)      No acute intracranial abnormality.      Sphenoid sinus air-fluid level consistent with acute sinusitis.                  Workstation performed: [id number]             Procedures    ED Medication and Procedure Management   Prior to Admission Medications   Prescriptions Last Dose Informant Patient Reported? Taking?   ondansetron (ZOFRAN) 4 mg tablet   No No   Sig: Take 1 tablet (4 mg total) by mouth every 8 (eight) hours as needed for nausea or vomiting      Facility-Administered Medications: None     Discharge Medication List as of 2/24/2025  3:26 PM        START taking these medications    Details   amoxicillin-clavulanate (AUGMENTIN) 875-125 mg per tablet Take 1 tablet by mouth every 12 (twelve) hours for 5 days, Starting Mon 2/24/2025, Until Sat 3/1/2025, Normal           CONTINUE these medications which have NOT CHANGED    Details   ondansetron (ZOFRAN) 4 mg tablet Take 1 tablet (4 mg total) by mouth every 8 (eight) hours as needed for nausea or vomiting, Starting Mon 2/24/2025, Normal           No discharge procedures on file.  ED SEPSIS DOCUMENTATION   Time reflects when diagnosis was documented in both MDM as applicable and the Disposition within this note       Time User Action Codes Description Comment    2/24/2025  3:23 PM Tab Tyson [R51.9] Headache     2/24/2025  3:23 PM Tab Tyson [J01.90] Acute sinusitis                  Tab Tyson PA-C  02/24/25 8566

## 2025-02-24 NOTE — Clinical Note
Kari Puente was seen and treated in our emergency department on 2/24/2025.                Diagnosis:     Kari  .    She may return on this date: 02/25/2025         If you have any questions or concerns, please don't hesitate to call.      Tab Tyson PA-C    ______________________________           _______________          _______________  Hospital Representative                              Date                                Time

## 2025-02-24 NOTE — DISCHARGE INSTRUCTIONS
Please take your Augmentin twice daily for the next 5 days.  This is antibiotic and may cause GI upset, possibly diarrhea, I recommend supplementing with a probiotic.  Probiotics can typically be found in yogurt these will often be in the labeled food products.  They also, the supplement form, sold over-the-counter in any pharmacy.  Please follow-up with your primary care provider for further evaluation and management.  Return to the ED if you develop any new or worsening symptoms.

## 2025-02-24 NOTE — PROGRESS NOTES
[facility]        NAME: Kari Puente is a 50 y.o. female  : 1975    MRN: [id number]  DATE: 2025  TIME: 11:12 AM    Assessment and Plan   Nausea [R11.0]  1. Nausea  Transfer to other facility    ondansetron (ZOFRAN) 4 mg tablet      2. Acute intractable headache, unspecified headache type  Transfer to other facility          Work note provided.     Patient reports she felt safe enough to go to ED and would UBER  Patient Instructions       Follow up with PCP in 3-5 days.  Proceed to  ER if symptoms worsen.    If tests have been performed at [facility], our office will contact you with results if changes need to be made to the care plan discussed with you at the visit.  [facility]    Chief Complaint     Chief Complaint   Patient presents with    Headache     Ingoing for about 24 hours  Has taken about 6 tylenol in the past 24 hours  Took some at 0200 this am and then vomited after   Has vomited about 5 times since headache started, states drank pepsi prior to arrival and symptoms are resolving          History of Present Illness       Patient is here today complaining of headache for over 24 hours. Headache is not controlled with OTC Tylenol. Patient admits no history of migraines. Admits visual changes. Admits vomiting three times. Denies any allergies to medications.     Patient reports she thinks headache is related to menopause. Last menstruation was 1 month ago.     Headache      Review of Systems   Review of Systems   Constitutional: Negative.    Eyes:  Positive for visual disturbance.   Respiratory: Negative.     Cardiovascular: Negative.    Gastrointestinal:  Positive for nausea and vomiting.   Neurological:  Positive for headaches.   Psychiatric/Behavioral: Negative.           Current Medications       Current Outpatient Medications:     ondansetron (ZOFRAN) 4 mg tablet, Take 1 tablet (4 mg total) by mouth every 8 (eight)  hours as needed for nausea or vomiting, Disp: 20 tablet, Rfl: 0    acetaminophen (TYLENOL) 650 mg CR tablet, Take 1 tablet (650 mg total) by mouth every 8 (eight) hours as needed for mild pain or moderate pain (Patient not taking: Reported on 2/24/2025), Disp: 30 tablet, Rfl: 0    buPROPion (WELLBUTRIN) 75 mg tablet, TAKE 1 TABLET BY MOUTH TWICE A DAY (Patient not taking: Reported on 2/24/2025), Disp: 180 tablet, Rfl: 1    cyanocobalamin (VITAMIN B-12) 1000 MCG tablet, Take 1 tablet (1,000 mcg total) by mouth daily (Patient not taking: Reported on 2/24/2025), Disp: 90 tablet, Rfl: 3    ergocalciferol (VITAMIN D2) 50,000 units, Take 1 capsule (50,000 Units total) by mouth 2 (two) times a week (Patient not taking: Reported on 2/24/2025), Disp: 16 capsule, Rfl: 0    gabapentin (Neurontin) 300 mg capsule, Take 1 capsule (300 mg total) by mouth daily at bedtime (Patient not taking: Reported on 2/24/2025), Disp: 30 capsule, Rfl: 1    naproxen (NAPROSYN) 500 mg tablet, Take 1 tablet (500 mg total) by mouth 2 (two) times a day as needed for mild pain or moderate pain (Patient not taking: Reported on 2/24/2025), Disp: 30 tablet, Rfl: 0    Current Allergies     Allergies as of 02/24/2025    (No Known Allergies)            The following portions of the patient's history were reviewed and updated as appropriate: allergies, current medications, past family history, past medical history, past social history, past surgical history and problem list.     No past medical history on file.    Past Surgical History:   Procedure Laterality Date    BREAST SURGERY Left     2019 in [place]       Family History   Problem Relation Age of Onset    No Known Problems Mother     No Known Problems Father     No Known Problems Sister     No Known Problems Sister     No Known Problems Sister     No Known Problems Sister     No Known Problems Sister     No Known Problems Sister     No Known Problems Sister     No Known Problems Sister     No Known  Problems Daughter     No Known Problems Maternal Grandmother     No Known Problems Maternal Grandfather     No Known Problems Paternal Grandmother     No Known Problems Paternal Grandfather     No Known Problems Maternal Aunt     No Known Problems Maternal Aunt     No Known Problems Maternal Aunt     No Known Problems Maternal Aunt     No Known Problems Maternal Aunt     No Known Problems Maternal Aunt     Breast cancer Neg Hx          Medications have been verified.        Objective   /84   Pulse 68   Temp (!) 96.7 °F (35.9 °C)   Resp 18   SpO2 99%   No LMP recorded.       Physical Exam     Physical Exam  Vitals and nursing note reviewed.   HENT:      Head: Normocephalic.      Right Ear: Tympanic membrane, ear canal and external ear normal.      Left Ear: Tympanic membrane, ear canal and external ear normal.      Mouth/Throat:      Mouth: Mucous membranes are moist.   Eyes:      Extraocular Movements: Extraocular movements intact.      Pupils: Pupils are equal, round, and reactive to light.      Comments: Light sensitivity noted   Cardiovascular:      Rate and Rhythm: Normal rate and regular rhythm.      Heart sounds: Normal heart sounds.   Pulmonary:      Breath sounds: Normal breath sounds.   Musculoskeletal:      Comments: Pain over cervical para-spinals. NO pain over cervical spine. Upper and lower body motor intact   Neurological:      Mental Status: She is alert and oriented to person, place, and time.   Psychiatric:         Mood and Affect: Mood normal.         Behavior: Behavior normal.

## 2025-02-24 NOTE — LETTER
February 24, 2025     Patient: Kari Puente   YOB: 1975   Date of Visit: 2/24/2025       To Whom it May Concern:    Kari Puente was seen in my clinic on 2/24/2025.  If you have any questions or concerns, please don't hesitate to call.         Sincerely,          Ivania Fan PA-C        CC: No Recipients

## 2025-03-20 ENCOUNTER — HOSPITAL ENCOUNTER (OUTPATIENT)
Dept: MAMMOGRAPHY | Facility: CLINIC | Age: 50
Discharge: HOME | End: 2025-03-20
Payer: MEDICARE

## 2025-03-20 ENCOUNTER — RESULTS FOLLOW-UP (OUTPATIENT)
Dept: FAMILY MEDICINE CLINIC | Facility: CLINIC | Age: 50
End: 2025-03-20

## 2025-03-20 VITALS — HEIGHT: 69 IN | BODY MASS INDEX: 40.43 KG/M2 | WEIGHT: 273 LBS

## 2025-03-20 DIAGNOSIS — R92.8 ABNORMAL MAMMOGRAM: ICD-10-CM

## 2025-03-20 PROCEDURE — G0279 TOMOSYNTHESIS, MAMMO: HCPCS

## 2025-03-20 PROCEDURE — 77066 DX MAMMO INCL CAD BI: CPT

## 2025-08-05 ENCOUNTER — OFFICE VISIT (OUTPATIENT)
Dept: FAMILY MEDICINE CLINIC | Facility: CLINIC | Age: 50
End: 2025-08-05

## 2025-08-05 VITALS
BODY MASS INDEX: 43.4 KG/M2 | SYSTOLIC BLOOD PRESSURE: 114 MMHG | TEMPERATURE: 97.9 F | RESPIRATION RATE: 18 BRPM | OXYGEN SATURATION: 96 % | HEIGHT: 69 IN | HEART RATE: 62 BPM | WEIGHT: 293 LBS | DIASTOLIC BLOOD PRESSURE: 72 MMHG

## 2025-08-05 DIAGNOSIS — E66.813 CLASS 3 SEVERE OBESITY DUE TO EXCESS CALORIES WITHOUT SERIOUS COMORBIDITY WITH BODY MASS INDEX (BMI) OF 40.0 TO 44.9 IN ADULT: ICD-10-CM

## 2025-08-05 DIAGNOSIS — F33.9 DEPRESSION, RECURRENT (HCC): ICD-10-CM

## 2025-08-05 DIAGNOSIS — R53.83 OTHER FATIGUE: ICD-10-CM

## 2025-08-05 DIAGNOSIS — K21.9 GASTROESOPHAGEAL REFLUX DISEASE WITHOUT ESOPHAGITIS: ICD-10-CM

## 2025-08-05 DIAGNOSIS — E53.8 B12 DEFICIENCY: Primary | ICD-10-CM

## 2025-08-05 DIAGNOSIS — R20.2 PARESTHESIAS: ICD-10-CM

## 2025-08-05 PROCEDURE — G2211 COMPLEX E/M VISIT ADD ON: HCPCS | Performed by: FAMILY MEDICINE

## 2025-08-05 PROCEDURE — 99214 OFFICE O/P EST MOD 30 MIN: CPT | Performed by: FAMILY MEDICINE

## 2025-08-06 ENCOUNTER — APPOINTMENT (OUTPATIENT)
Dept: LAB | Facility: HOSPITAL | Age: 50
End: 2025-08-06
Payer: MEDICARE